# Patient Record
Sex: FEMALE | Race: WHITE | Employment: UNEMPLOYED | URBAN - METROPOLITAN AREA
[De-identification: names, ages, dates, MRNs, and addresses within clinical notes are randomized per-mention and may not be internally consistent; named-entity substitution may affect disease eponyms.]

---

## 2021-11-12 ENCOUNTER — TELEPHONE (OUTPATIENT)
Dept: CARDIOLOGY CLINIC | Age: 51
End: 2021-11-12

## 2022-01-06 PROBLEM — R07.9 CHEST PAIN: Status: ACTIVE | Noted: 2022-01-06

## 2022-01-06 PROBLEM — E78.5 HYPERLIPIDEMIA: Status: ACTIVE | Noted: 2022-01-06

## 2022-01-06 PROBLEM — R00.2 PALPITATIONS: Status: ACTIVE | Noted: 2022-01-06

## 2022-01-06 NOTE — PROGRESS NOTES
[facility]   Cardiac Consultation    Referring Provider:  Ranjana Morrison MD     Cc- chest pain,palpitations     History of Present Illness:   Detra Mcburney is a 54 y.o. female seen as a new patient for chest pain/palpitations. History of Hld, tobacco abuse, carotid disease. Today she reports that she has been having intermittent chest pain. She reports numbness in her upper chest and back as well as her lower legs (Lt more so that the Rt). She has pain in the back of her neck. Past Medical History:   has a past medical history of Asthma, COPD (chronic obstructive pulmonary disease), Hypercholesteremia, Migraine, Scoliosis, and Seasonal allergies. Surgical History:   has a past surgical history that includes Cholecystectomy; Tonsillectomy; Tubal ligation; and Tonsillectomy and adenoidectomy. Social History:   reports that she has been smoking. She has been smoking about 0.50 packs per day. She does not have any smokeless tobacco history on file. She reports that she does not drink alcohol and does not use drugs. Family History:  family history includes Heart Disease in her maternal grandfather, maternal grandmother, mother, paternal grandfather, and paternal grandmother. Home Medications:  Prior to Admission medications    Medication Sig Start Date End Date Taking? Authorizing Provider   etodolac (LODINE) 300 MG capsule Take 300 mg by mouth daily. Historical Provider, MD   gabapentin (NEURONTIN) 300 MG capsule Take 300 mg by mouth 3 times daily. Historical Provider, MD   cyclobenzaprine (FLEXERIL) 10 MG tablet Take 10 mg by mouth 3 times daily as needed. Historical Provider, MD   montelukast (SINGULAIR) 10 MG tablet Take 10 mg by mouth nightly. Historical Provider, MD   omeprazole-sodium bicarbonate (ZEGERID)  MG PACK 40 mg every morning (before breakfast).       Historical Provider, MD   hydrocodone-acetaminophen (NORCO) 5-325 MG per tablet temperature intolerance. No excessive thirst, fluid intake, or urination. No tremor. · Hematologic/Lymphatic: No abnormal bruising or bleeding, blood clots or swollen lymph nodes. · Allergic/Immunologic: No nasal congestion or hives. Physical Examination:    Vitals:    01/19/22 1022   BP: 100/60   Pulse: 105   SpO2: 98%          Wt Readings from Last 1 Encounters:   11/21/11 125 lb (56.7 kg)       Constitutional and General Appearance: NAD  Skin:good turgor,intact without lesions  HEENT: EOMI ,normal  Neck:no JVD    Respiratory:  · Normal excursion and expansion without use of accessory muscles  · Resp Auscultation: Normal breath sounds without dullness  Cardiovascular:  · The apical impulses not displaced  · Heart tones are crisp and normal  · Cervical veins are not engorged  · The carotid upstroke is normal in amplitude and contour without delay or bruit  ·   · Peripheral pulses are symmetrical and full  · There is no clubbing, cyanosis of the extremities. · No edema  · Femoral Arteries: 2+ and equal  · Pedal Pulses: 2+ and equal   Abdomen:  · No masses or tenderness  · Liver/Spleen: No Abnormalities Noted  Neurological/Psychiatric:  · Alert and oriented in all spheres  · Moves all extremities well  · Exhibits normal gait balance and coordination  · No abnormalities of mood, affect, memory, mentation, or behavior are noted      Assessment/Plan:    1. Chest pain     2.  Hypotension  /60 (Site: Right Upper Arm, Position: Sitting, Cuff Size: Medium Adult)   Pulse 105   Ht 5' 2\" (1.575 m)   Wt 109 lb 6.4 oz (49.6 kg)   SpO2 98%   BMI 20.01 kg/m²   -takes clonidine for sleep   -consider stopping d/t lower BP, OK to continue melatonin for sleep     3.          4.          5.         6.   Hyperlipidemia  8/2021   HDL 28   Currently not taking anything      Tobacco abuse -   Has previously stopped,  but restarted  Plan- counseled on importance of smoking cessation, she may consider trying chantix        Following [facility]      Carotid stenosis   Dopplers 1/18/22 16-49% bilateral internal arteries    Dopplers on lower extremities for claudication symptoms             Plan:  CT calcium score  ABIs with follow up next week   Nano Zuniga has a stable cardiac status. Cardiac test and lab results personally reviewed by me during this office visit and discussed. No med changes. Continue risk factor modifications. Call for any change in symptoms. Return for regular follow up in 3 months. I appreciate the opportunity of cooperating in the care of this individual.    Patient's problem list, medications, allergies, past medical, surgical, social and family histories were reviewed and updated as appropriate. Petey Neumann M.D., [facility]    The scribe's documentation has been prepared under my direction and personally reviewed by me in its entirety. I confirm that the note above accurately reflects all work, treatment, procedures, and medical decision making performed by me.

## 2022-01-19 ENCOUNTER — TELEPHONE (OUTPATIENT)
Dept: CARDIOLOGY CLINIC | Age: 52
End: 2022-01-19

## 2022-01-19 ENCOUNTER — OFFICE VISIT (OUTPATIENT)
Dept: CARDIOLOGY CLINIC | Age: 52
End: 2022-01-19
Payer: COMMERCIAL

## 2022-01-19 VITALS
SYSTOLIC BLOOD PRESSURE: 100 MMHG | HEIGHT: 62 IN | OXYGEN SATURATION: 98 % | HEART RATE: 105 BPM | WEIGHT: 109.4 LBS | BODY MASS INDEX: 20.13 KG/M2 | DIASTOLIC BLOOD PRESSURE: 60 MMHG

## 2022-01-19 DIAGNOSIS — R00.2 PALPITATIONS: ICD-10-CM

## 2022-01-19 DIAGNOSIS — R07.9 CHEST PAIN, UNSPECIFIED TYPE: Primary | ICD-10-CM

## 2022-01-19 DIAGNOSIS — E78.5 HYPERLIPIDEMIA, UNSPECIFIED HYPERLIPIDEMIA TYPE: ICD-10-CM

## 2022-01-19 DIAGNOSIS — I73.9 CLAUDICATION (HCC): ICD-10-CM

## 2022-01-19 PROCEDURE — G8427 DOCREV CUR MEDS BY ELIG CLIN: HCPCS | Performed by: INTERNAL MEDICINE

## 2022-01-19 PROCEDURE — 99203 OFFICE O/P NEW LOW 30 MIN: CPT | Performed by: INTERNAL MEDICINE

## 2022-01-19 PROCEDURE — G8420 CALC BMI NORM PARAMETERS: HCPCS | Performed by: INTERNAL MEDICINE

## 2022-01-19 PROCEDURE — G8484 FLU IMMUNIZE NO ADMIN: HCPCS | Performed by: INTERNAL MEDICINE

## 2022-01-19 PROCEDURE — 4004F PT TOBACCO SCREEN RCVD TLK: CPT | Performed by: INTERNAL MEDICINE

## 2022-01-19 RX ORDER — POLYETHYLENE GLYCOL 3350 17 G/17G
17 POWDER, FOR SOLUTION ORAL DAILY
COMMUNITY

## 2022-01-19 RX ORDER — CITALOPRAM 20 MG/1
TABLET ORAL
COMMUNITY
Start: 2021-11-03

## 2022-01-19 RX ORDER — ALBUTEROL SULFATE 90 UG/1
AEROSOL, METERED RESPIRATORY (INHALATION)
COMMUNITY
Start: 2021-08-20

## 2022-01-19 RX ORDER — LANOLIN ALCOHOL/MO/W.PET/CERES
10 CREAM (GRAM) TOPICAL NIGHTLY PRN
COMMUNITY

## 2022-01-19 RX ORDER — CLONIDINE HYDROCHLORIDE 0.2 MG/1
TABLET ORAL
COMMUNITY
Start: 2021-12-27

## 2022-01-19 NOTE — PATIENT INSTRUCTIONS
Recommend stopping clonidine since your blood pressure runs low - ok to continue with Melatonin  Schedule a CT calcium score to check calcium in your arteries  Schedule dopplers for your lower extremities to check your circulation

## 2022-01-19 NOTE — TELEPHONE ENCOUNTER
The VL DUP LOWER EXT test ordered, can not be done stat. Chart notes differ from what was ordered. Should it be PARKER's? If not, a new order needs placed for a routine VL DUP LOWER EXT. Call vascular lab with any questions.

## 2022-01-20 ENCOUNTER — HOSPITAL ENCOUNTER (OUTPATIENT)
Dept: CT IMAGING | Age: 52
Discharge: HOME | End: 2022-01-20
Payer: COMMERCIAL

## 2022-01-20 ENCOUNTER — HOSPITAL ENCOUNTER (OUTPATIENT)
Dept: VASCULAR LAB | Age: 52
Discharge: HOME | End: 2022-01-20
Payer: COMMERCIAL

## 2022-01-20 DIAGNOSIS — E78.5 HYPERLIPIDEMIA, UNSPECIFIED HYPERLIPIDEMIA TYPE: ICD-10-CM

## 2022-01-20 DIAGNOSIS — I73.9 CLAUDICATION (HCC): ICD-10-CM

## 2022-01-20 PROCEDURE — 75571 CT HRT W/O DYE W/CA TEST: CPT

## 2022-01-20 PROCEDURE — 93922 UPR/L XTREMITY ART 2 LEVELS: CPT

## 2022-01-24 NOTE — PROGRESS NOTES
[address]   Cardiac f/up    Referring Provider:  Manoj Muhammad MD     Cc- chest pain,palpitations     History of Present Illness:   George Mcbrdie is a 54 y.o. female seen as a f/upt for chest pain/palpitations. History of Hld, tobacco abuse, carotid disease. Today she reports that she has been having intermittent chest pain. She reports numbness in her upper chest and back as well as her lower legs (Lt more so that the Rt). She has pain in the back of her neck. Discussed her PARKER's and her CT Calcium score. She is getting an MRI of her back and neck. Past Medical History:   has a past medical history of Asthma, COPD (chronic obstructive pulmonary disease) ([address] Hypercholesteremia, Migraine, Scoliosis, and Seasonal allergies. Surgical History:   has a past surgical history that includes Cholecystectomy; Tonsillectomy; Tubal ligation; and Tonsillectomy and adenoidectomy. Social History:   reports that she has been smoking. She has been smoking about 0.50 packs per day. She does not have any smokeless tobacco history on file. She reports that she does not drink alcohol and does not use drugs. Family History:  family history includes Heart Disease in her maternal grandfather, maternal grandmother, mother, paternal grandfather, and paternal grandmother. Home Medications:  Prior to Admission medications    Medication Sig Start Date End Date Taking?  Authorizing Provider   albuterol sulfate HFA (PROAIR HFA) 108 (90 Base) MCG/ACT inhaler ProAir HFA 90 mcg/actuation aerosol inhaler 8/20/21   Historical Provider, MD   cloNIDine (CATAPRES) 0.2 MG tablet  12/27/21   Historical Provider, MD   citalopram (CELEXA) 20 MG tablet  11/3/21   Historical Provider, MD   melatonin 3 MG TABS tablet Take 10 mg by mouth nightly as needed    Historical Provider, MD   polyethylene glycol (MIRALAX) 17 GM/SCOOP powder Take 17 g by mouth daily    Historical Provider, MD   montelukast (SINGULAIR) 10 MG tablet Take 10 mg by mouth nightly. Historical Provider, MD   omeprazole-sodium bicarbonate (ZEGERID)  MG PACK 40 mg every morning (before breakfast). Historical Provider, MD   alprazolam Donodalysjerell Perish) 0.5 MG tablet Take 0.5 mg by mouth nightly as needed. Historical Provider, MD   loratadine (CLARITIN) 10 MG tablet Take 10 mg by mouth daily. Historical Provider, MD   UNABLE TO FIND Indications: BIRTH CONTROL  Patient not taking: Reported on 1/19/2022    Historical Provider, MD        Allergies:  Penicillins, Biaxin [clarithromycin], Imitrex [sumatriptan], Topamax, and Aspirin     Review of Systems:   · Constitutional: there has been no unanticipated weight loss. There's been no change in energy level, sleep pattern, or activity level. · Eyes: No visual changes or diplopia. No scleral icterus. · ENT: No Headaches, hearing loss or vertigo. No mouth sores or sore throat. · Cardiovascular: Reviewed in HPI  · Respiratory: No cough or wheezing, no sputum production. No hematemesis. · Gastrointestinal: No abdominal pain, appetite loss, blood in stools. No change in bowel or bladder habits. · Genitourinary: No dysuria, trouble voiding, or hematuria. · Musculoskeletal:  No gait disturbance, weakness or joint complaints. · Integumentary: No rash or pruritis. · Neurological: No headache, diplopia, change in muscle strength, numbness or tingling. No change in gait, balance, coordination, mood, affect, memory, mentation, behavior. · Psychiatric: No anxiety, no depression. · Endocrine: No malaise, fatigue or temperature intolerance. No excessive thirst, fluid intake, or urination. No tremor. · Hematologic/Lymphatic: No abnormal bruising or bleeding, blood clots or swollen lymph nodes. · Allergic/Immunologic: No nasal congestion or hives.     Physical Examination:    Vitals:    01/26/22 1054   BP: 108/64   Pulse: 103   SpO2: 98%          Wt Readings from Last 1 Encounters:   01/26/22 109 lb me during this office visit and discussed. No med changes. Symptoms are noncardiac based on evaluation. Would wait until MRI of spine done. Based on low cardiac calcium score would first use diet for lipid management. Likely will need statin but so much in way of pain symptoms would not want to start one yet  Continue risk factor modifications. Call for any change in symptoms. Return for regular follow up as needed. I appreciate the opportunity of cooperating in the care of this individual.    Patient's problem list, medications, allergies, past medical, surgical, social and family histories were reviewed and updated as appropriate. Nestor Hidalgo M.D., [address] attestation:  This note was scribed in the presence of Dr. Phong Hidalgo MD, by Mike James RN

## 2022-01-26 ENCOUNTER — OFFICE VISIT (OUTPATIENT)
Dept: CARDIOLOGY CLINIC | Age: 52
End: 2022-01-26
Payer: COMMERCIAL

## 2022-01-26 VITALS
DIASTOLIC BLOOD PRESSURE: 64 MMHG | BODY MASS INDEX: 20.09 KG/M2 | OXYGEN SATURATION: 98 % | WEIGHT: 109.2 LBS | HEIGHT: 62 IN | SYSTOLIC BLOOD PRESSURE: 108 MMHG | HEART RATE: 103 BPM

## 2022-01-26 DIAGNOSIS — I10 ESSENTIAL HYPERTENSION: Primary | ICD-10-CM

## 2022-01-26 DIAGNOSIS — E78.5 HYPERLIPIDEMIA, UNSPECIFIED HYPERLIPIDEMIA TYPE: ICD-10-CM

## 2022-01-26 PROCEDURE — G8484 FLU IMMUNIZE NO ADMIN: HCPCS | Performed by: INTERNAL MEDICINE

## 2022-01-26 PROCEDURE — G8420 CALC BMI NORM PARAMETERS: HCPCS | Performed by: INTERNAL MEDICINE

## 2022-01-26 PROCEDURE — G8427 DOCREV CUR MEDS BY ELIG CLIN: HCPCS | Performed by: INTERNAL MEDICINE

## 2022-01-26 PROCEDURE — 99214 OFFICE O/P EST MOD 30 MIN: CPT | Performed by: INTERNAL MEDICINE

## 2022-01-26 PROCEDURE — 4004F PT TOBACCO SCREEN RCVD TLK: CPT | Performed by: INTERNAL MEDICINE

## 2022-01-26 NOTE — PATIENT INSTRUCTIONS
No med changes. Continue risk factor modifications. Call for any change in symptoms. Return for regular follow up as needed.

## 2022-11-09 ENCOUNTER — TELEPHONE (OUTPATIENT)
Dept: CARDIOLOGY CLINIC | Age: 52
End: 2022-11-09

## 2022-11-09 NOTE — TELEPHONE ENCOUNTER
Pt came in the office to schedule an appt with les. Pt states she is still experiencing the heart racing feeling and would like to be seen. Please advise to where we can schedule her.  Please advise thank you

## 2022-11-09 NOTE — TELEPHONE ENCOUNTER
Pt will have monitor placed 11/10 at 230pm. Please advise a date and time for  1mnth f/u so pt can be informed tomorrow at appt.

## 2022-11-10 ENCOUNTER — NURSE ONLY (OUTPATIENT)
Dept: CARDIOLOGY CLINIC | Age: 52
End: 2022-11-10

## 2022-11-10 DIAGNOSIS — R00.2 PALPITATIONS: Primary | ICD-10-CM

## 2022-11-10 NOTE — TELEPHONE ENCOUNTER
Please offer / schedule apt on December 16 at 8:45 or 9:45 am in [facility] with Dr Dilip Aguilar. (Pt to come in today for monitor placement).

## 2022-11-16 PROBLEM — I10 ESSENTIAL HYPERTENSION: Status: ACTIVE | Noted: 2022-11-16

## 2022-11-16 NOTE — PROGRESS NOTES
[address]   Cardiac f/up    Referring Provider:  Gnozález Nolasco MD     Cc- chest pain,palpitations     History of Present Illness:   Manolo Branch is a 54 y.o. female seen as a f/upt for chest pain/palpitations. History of Hld, tobacco abuse, carotid disease. Today she reports that she has been having intermittent chest pain. She reports numbness in her upper chest and back as well as her lower legs (Lt more so that the Rt). She has pain in the back of her neck. Discussed her PARKER's and her CT Calcium score. She is getting an MRI of her back and neck. Past Medical History:   has a past medical history of Asthma, COPD (chronic obstructive pulmonary disease) ([facility] [address] Hypercholesteremia, Migraine, Scoliosis, and Seasonal allergies. Surgical History:   has a past surgical history that includes Cholecystectomy; Tonsillectomy; Tubal ligation; and Tonsillectomy and adenoidectomy. Social History:   reports that she has been smoking. She has been smoking an average of .5 packs per day. She has never used smokeless tobacco. She reports that she does not drink alcohol and does not use drugs. Family History:  family history includes Heart Disease in her maternal grandfather, maternal grandmother, mother, paternal grandfather, and paternal grandmother. Home Medications:  Prior to Admission medications    Medication Sig Start Date End Date Taking?  Authorizing Provider   albuterol sulfate HFA (PROAIR HFA) 108 (90 Base) MCG/ACT inhaler ProAir HFA 90 mcg/actuation aerosol inhaler 8/20/21   Historical Provider, MD   cloNIDine (CATAPRES) 0.2 MG tablet  12/27/21   Historical Provider, MD   citalopram (CELEXA) 20 MG tablet  11/3/21   Historical Provider, MD   melatonin 3 MG TABS tablet Take 10 mg by mouth nightly as needed    Historical Provider, MD   polyethylene glycol (MIRALAX) 17 GM/SCOOP powder Take 17 g by mouth daily    Historical Provider, MD   montelukast (SINGULAIR) 10 MG tablet Take 10 mg by mouth nightly. Historical Provider, MD   omeprazole-sodium bicarbonate (ZEGERID)  MG PACK 40 mg every morning (before breakfast). Historical Provider, MD   alprazolam Uriah Daniels) 0.5 MG tablet Take 0.5 mg by mouth nightly as needed. Historical Provider, MD   loratadine (CLARITIN) 10 MG tablet Take 10 mg by mouth daily. Historical Provider, MD   UNABLE TO FIND Indications: BIRTH CONTROL  Patient not taking: Reported on 1/19/2022    Historical Provider, MD        Allergies:  Penicillins, Biaxin [clarithromycin], Imitrex [sumatriptan], Topamax, and Aspirin     Review of Systems:   Constitutional: there has been no unanticipated weight loss. There's been no change in energy level, sleep pattern, or activity level. Eyes: No visual changes or diplopia. No scleral icterus. ENT: No Headaches, hearing loss or vertigo. No mouth sores or sore throat. Cardiovascular: Reviewed in HPI  Respiratory: No cough or wheezing, no sputum production. No hematemesis. Gastrointestinal: No abdominal pain, appetite loss, blood in stools. No change in bowel or bladder habits. Genitourinary: No dysuria, trouble voiding, or hematuria. Musculoskeletal:  No gait disturbance, weakness or joint complaints. Integumentary: No rash or pruritis. Neurological: No headache, diplopia, change in muscle strength, numbness or tingling. No change in gait, balance, coordination, mood, affect, memory, mentation, behavior. Psychiatric: No anxiety, no depression. Endocrine: No malaise, fatigue or temperature intolerance. No excessive thirst, fluid intake, or urination. No tremor. Hematologic/Lymphatic: No abnormal bruising or bleeding, blood clots or swollen lymph nodes. Allergic/Immunologic: No nasal congestion or hives. Physical Examination:    There were no vitals filed for this visit.          Wt Readings from Last 1 Encounters:   01/26/22 109 lb 3.2 oz (49.5 kg)       Constitutional and General Appearance: NAD  Skin:good turgor,intact without lesions  HEENT: EOMI ,normal  Neck:no JVD    Respiratory:  Normal excursion and expansion without use of accessory muscles  Resp Auscultation: Normal breath sounds without dullness  Cardiovascular: The apical impulses not displaced  Heart tones are crisp and normal  Cervical veins are not engorged  The carotid upstroke is normal in amplitude and contour without delay or bruit    Peripheral pulses are symmetrical and full  There is no clubbing, cyanosis of the extremities. No edema  Femoral Arteries: 2+ and equal  Pedal Pulses: 2+ and equal   Abdomen:  No masses or tenderness  Liver/Spleen: No Abnormalities Noted  Neurological/Psychiatric:  Alert and oriented in all spheres  Moves all extremities well  Exhibits normal gait balance and coordination  No abnormalities of mood, affect, memory, mentation, or behavior are noted      Assessment/Plan:    1. Chest Pain-  CT Calcium Score Total 1/20/22 : 1       2. Hypotension  There were no vitals taken for this visit.  -takes clonidine for sleep   -consider stopping d/t lower BP, OK to continue melatonin for sleep     3.          4.          5.         6.   Hyperlipidemia  8/10/2021   HDL 28   Currently not taking anything    Tobacco abuse -   Has previously stopped,  but restarted  Plan- counseled on importance of smoking cessation, she may consider trying chantix      Following [facility]-for thrombocytosis. Carotid stenosis   Dopplers 1/18/22 16-49% bilateral internal arteries    Dopplers on lower extremities for claudication symptoms. No significant peripheral vascular disease             Plan:  Melchor Morales has a stable cardiac status. Cardiac test and lab results personally reviewed by me during this office visit and discussed. No med changes. Symptoms are noncardiac based on evaluation. Would wait until MRI of spine done. Based on low cardiac calcium score would first use diet for lipid management.  Likely

## 2022-12-15 NOTE — PROGRESS NOTES
[facility]   Cardiac f/up    Referring Provider:  Ponciano Cockayne, MD     Cc- chest pain,palpitations     History of Present Illness:   Luz Chacon is a 54 y.o. female seen as a f/upt for chest pain/palpitations. History of Hld, tobacco abuse, carotid disease. 11/10/2022 holter placed for complaints of heart racing     Today she complains of heart racing and some chest discomfort at times. She has lost 20 lbs recently. She is getting ready to have endoscopy and colonoscopy on 12/22/22 to determine reason for weight loss. She states she is eating, but sometimes she doesn't have an appetite. States she didn't eat yesterday. She drinks MtDew. She used to drink back 4-5 a day and now only 1-1.5 a day. Discussed her heart racing and likely due to lack of calories and fluids. She does drink smart water. She reports her brothers both had testing and one did have blockage. Heart and lungs sound good on auscultation today. Past Medical History:   has a past medical history of Asthma, COPD (chronic obstructive pulmonary disease) ([address] Essential hypertension, Hypercholesteremia, Migraine, Scoliosis, and Seasonal allergies. Surgical History:   has a past surgical history that includes Cholecystectomy; Tonsillectomy; Tubal ligation; and Tonsillectomy and adenoidectomy. Social History:   reports that she has been smoking cigarettes. She has been smoking an average of .5 packs per day. She has never used smokeless tobacco. She reports that she does not drink alcohol and does not use drugs. Family History:  family history includes Heart Disease in her maternal grandfather, maternal grandmother, mother, paternal grandfather, and paternal grandmother. Home Medications:  Prior to Admission medications    Medication Sig Start Date End Date Taking?  Authorizing Provider   folic acid 5 MG/ML injection Inject 5 mg into the muscle daily 11/29/22 2/27/23 Yes Historical Provider, MD pyridoxine (B-6) 100 MG tablet Take 100 mg by mouth daily 11/29/22 11/29/23 Yes Historical Provider, MD   cyanocobalamin 1000 MCG/ML injection Inject 1,000 mcg into the muscle every 14 days 10/25/22  Yes Historical Provider, MD   albuterol sulfate HFA (PROVENTIL;VENTOLIN;PROAIR) 108 (90 Base) MCG/ACT inhaler ProAir HFA 90 mcg/actuation aerosol inhaler 8/20/21  Yes Historical Provider, MD   citalopram (CELEXA) 20 MG tablet Take 20 mg by mouth daily 11/3/21  Yes Historical Provider, MD   melatonin 3 MG TABS tablet Take 10 mg by mouth nightly as needed   Yes Historical Provider, MD   montelukast (SINGULAIR) 10 MG tablet Take 10 mg by mouth nightly. Yes Historical Provider, MD   omeprazole-sodium bicarbonate (ZEGERID)  MG PACK 40 mg every morning (before breakfast). Yes Historical Provider, MD   alprazolam Lesta Spells) 0.5 MG tablet Take 0.5 mg by mouth nightly as needed. Yes Historical Provider, MD   polyethylene glycol (MIRALAX) 17 GM/SCOOP powder Take 17 g by mouth daily    Historical Provider, MD   UNABLE TO FIND Indications: [address] [place] [address]  Patient not taking: Reported on 1/19/2022    Historical Provider, MD        Allergies:  Penicillins, Biaxin [clarithromycin], Imitrex [sumatriptan], Topamax, and Aspirin     Review of Systems:   Constitutional: there has been no unanticipated weight loss. There's been no change in energy level, sleep pattern, or activity level. Eyes: No visual changes or diplopia. No scleral icterus. ENT: No Headaches, hearing loss or vertigo. No mouth sores or sore throat. Cardiovascular: Reviewed in HPI  Respiratory: No cough or wheezing, no sputum production. No hematemesis. Gastrointestinal: No abdominal pain, appetite loss, blood in stools. No change in bowel or bladder habits. Genitourinary: No dysuria, trouble voiding, or hematuria. Musculoskeletal:  No gait disturbance, weakness or joint complaints. Integumentary: No rash or pruritis.   Neurological: No headache, diplopia, change in muscle strength, numbness or tingling. No change in gait, balance, coordination, mood, affect, memory, mentation, behavior. Psychiatric: No anxiety, no depression. Endocrine: No malaise, fatigue or temperature intolerance. No excessive thirst, fluid intake, or urination. No tremor. Hematologic/Lymphatic: No abnormal bruising or bleeding, blood clots or swollen lymph nodes. Allergic/Immunologic: No nasal congestion or hives. Physical Examination:    Vitals:    12/16/22 1002   BP: (!) 90/54   Pulse:    SpO2:             Wt Readings from Last 1 Encounters:   12/16/22 81 lb (36.7 kg)       Constitutional and General Appearance: NAD  Skin:good turgor,intact without lesions  HEENT: EOMI ,normal  Neck:no JVD    Respiratory:  Normal excursion and expansion without use of accessory muscles  Resp Auscultation: Normal breath sounds without dullness  Cardiovascular: The apical impulses not displaced  Heart tones are crisp and normal  Cervical veins are not engorged  The carotid upstroke is normal in amplitude and contour without delay or bruit    Peripheral pulses are symmetrical and full  There is no clubbing, cyanosis of the extremities. No edema  Femoral Arteries: 2+ and equal  Pedal Pulses: 2+ and equal   Abdomen:  No masses or tenderness  Liver/Spleen: No Abnormalities Noted  Neurological/Psychiatric:  Alert and oriented in all spheres  Moves all extremities well  Exhibits normal gait balance and coordination  No abnormalities of mood, affect, memory, mentation, or behavior are noted      Assessment/Plan:    1. Palpitations   -epatch placed 11/10/2022  one run of SVT      2. Hypotension    Vitals:    12/16/22 1002   BP: (!) 90/54   Pulse:    SpO2:    -takes clonidine for sleep - not taking currently 12/16/22  Needs to be off clonidine    3. Hyperlipidemia  -8/10/2021   HDL 28   --CT Calcium Score Total 1/20/22 : 1    4.  Tobacco abuse -   -Has previously stopped,  but restarted  -Plan- counseled on importance of smoking cessation, she may consider trying chantix   - getting ready to start chantix to help quitting     5. Carotid stenosis   -Dopplers 1/18/22 16-49% bilateral internal arteries    -Dopplers on lower extremities for claudication symptoms. No significant peripheral vascular disease        Plan:  Jennifer Rausch has a stable cardiac status. Cardiac test and lab results personally reviewed by me during this office visit and discussed. With profound weight loss statin not indicated  Continue risk factor modifications. Call for any change in symptoms. Work on [employer], good water hydration, stay away from caffeine  PCP starting chantix to help with smoking cessation. Echo soon  Return for regular follow up 1 month         I appreciate the opportunity of cooperating in the care of this individual.    Patient's problem list, medications, allergies, past medical, surgical, social and family histories were reviewed and updated as appropriate. Kevin Pérez M.D., [address]: This note was scribed in the presence of Dr. Zunilda Pérez  by Ian Madrigal RN. The scribe's documentation has been prepared under my direction and personally reviewed by me in its entirety. I confirm that the note above accurately reflects all work, treatment, procedures, and medical decision making performed by me.

## 2022-12-15 NOTE — PROGRESS NOTES
[facility]   Cardiac f/up    Referring Provider:  Fabien Lopez MD     Cc- chest pain,palpitations     History of Present Illness:   Rnean Wade is a 54 y.o. female seen as a f/upt for chest pain/palpitations. History of Hld, tobacco abuse, carotid disease. 11/10/2022 holter placed for complaints of heart racing     Today she ***      Past Medical History:   has a past medical history of Asthma, COPD (chronic obstructive pulmonary disease) ([address] Essential hypertension, Hypercholesteremia, Migraine, Scoliosis, and Seasonal allergies. Surgical History:   has a past surgical history that includes Cholecystectomy; Tonsillectomy; Tubal ligation; and Tonsillectomy and adenoidectomy. Social History:   reports that she has been smoking. She has been smoking an average of .5 packs per day. She has never used smokeless tobacco. She reports that she does not drink alcohol and does not use drugs. Family History:  family history includes Heart Disease in her maternal grandfather, maternal grandmother, mother, paternal grandfather, and paternal grandmother. Home Medications:  Prior to Admission medications    Medication Sig Start Date End Date Taking? Authorizing Provider   albuterol sulfate HFA (PROAIR HFA) 108 (90 Base) MCG/ACT inhaler ProAir HFA 90 mcg/actuation aerosol inhaler 8/20/21   Historical Provider, MD   cloNIDine (CATAPRES) 0.2 MG tablet  12/27/21   Historical Provider, MD   citalopram (CELEXA) 20 MG tablet  11/3/21   Historical Provider, MD   melatonin 3 MG TABS tablet Take 10 mg by mouth nightly as needed    Historical Provider, MD   polyethylene glycol (MIRALAX) 17 GM/SCOOP powder Take 17 g by mouth daily    Historical Provider, MD   montelukast (SINGULAIR) 10 MG tablet Take 10 mg by mouth nightly. Historical Provider, MD   omeprazole-sodium bicarbonate (ZEGERID)  MG PACK 40 mg every morning (before breakfast).       Historical Provider, MD   alprazolam Monna Pressman) 0.5 MG tablet Take 0.5 mg by mouth nightly as needed. Historical Provider, MD   loratadine (CLARITIN) 10 MG tablet Take 10 mg by mouth daily. Historical Provider, MD   UNABLE TO FIND Indications: BIRTH CONTROL  Patient not taking: Reported on 1/19/2022    Historical Provider, MD        Allergies:  Penicillins, Biaxin [clarithromycin], Imitrex [sumatriptan], Topamax, and Aspirin     Review of Systems:   Constitutional: there has been no unanticipated weight loss. There's been no change in energy level, sleep pattern, or activity level. Eyes: No visual changes or diplopia. No scleral icterus. ENT: No Headaches, hearing loss or vertigo. No mouth sores or sore throat. Cardiovascular: Reviewed in HPI  Respiratory: No cough or wheezing, no sputum production. No hematemesis. Gastrointestinal: No abdominal pain, appetite loss, blood in stools. No change in bowel or bladder habits. Genitourinary: No dysuria, trouble voiding, or hematuria. Musculoskeletal:  No gait disturbance, weakness or joint complaints. Integumentary: No rash or pruritis. Neurological: No headache, diplopia, change in muscle strength, numbness or tingling. No change in gait, balance, coordination, mood, affect, memory, mentation, behavior. Psychiatric: No anxiety, no depression. Endocrine: No malaise, fatigue or temperature intolerance. No excessive thirst, fluid intake, or urination. No tremor. Hematologic/Lymphatic: No abnormal bruising or bleeding, blood clots or swollen lymph nodes. Allergic/Immunologic: No nasal congestion or hives. Physical Examination:    There were no vitals filed for this visit.          Wt Readings from Last 1 Encounters:   01/26/22 109 lb 3.2 oz (49.5 kg)       Constitutional and General Appearance: NAD  Skin:good turgor,intact without lesions  HEENT: EOMI ,normal  Neck:no JVD    Respiratory:  Normal excursion and expansion without use of accessory muscles  Resp Auscultation: Normal breath sounds without dullness  Cardiovascular: The apical impulses not displaced  Heart tones are crisp and normal  Cervical veins are not engorged  The carotid upstroke is normal in amplitude and contour without delay or bruit    Peripheral pulses are symmetrical and full  There is no clubbing, cyanosis of the extremities. No edema  Femoral Arteries: 2+ and equal  Pedal Pulses: 2+ and equal   Abdomen:  No masses or tenderness  Liver/Spleen: No Abnormalities Noted  Neurological/Psychiatric:  Alert and oriented in all spheres  Moves all extremities well  Exhibits normal gait balance and coordination  No abnormalities of mood, affect, memory, mentation, or behavior are noted      Assessment/Plan:    1. Palpitations   -epatch placed 11/10/2022 ***      2. Hypotension    There were no vitals filed for this visit.  -takes clonidine for sleep   -consider stopping d/t lower BP, OK to continue melatonin for sleep    3. Hyperlipidemia  -8/10/2021   HDL 28   --CT Calcium Score Total 1/20/22 : 1    4. Tobacco abuse -   -Has previously stopped,  but restarted  -Plan- counseled on importance of smoking cessation, she may consider trying chantix      5. Carotid stenosis   -Dopplers 1/18/22 16-49% bilateral internal arteries    -Dopplers on lower extremities for claudication symptoms. No significant peripheral vascular disease        Plan:  Efraín Renteria has a stable cardiac status. Cardiac test and lab results personally reviewed by me during this office visit and discussed. No med changes. Symptoms are noncardiac based on evaluation. Would wait until MRI of spine done. Based on low cardiac calcium score would first use diet for lipid management. Likely will need statin but so much in way of pain symptoms would not want to start one yet  Continue risk factor modifications. Call for any change in symptoms.   Return for regular follow up ***        I appreciate the opportunity of cooperating in the care of this individual.    Patient's problem list, medications, allergies, past medical, surgical, social and family histories were reviewed and updated as appropriate. Jessenia Merchant M.D., [facility]

## 2022-12-16 ENCOUNTER — OFFICE VISIT (OUTPATIENT)
Dept: CARDIOLOGY CLINIC | Age: 52
End: 2022-12-16

## 2022-12-16 VITALS
HEIGHT: 62 IN | WEIGHT: 81 LBS | BODY MASS INDEX: 14.91 KG/M2 | HEART RATE: 83 BPM | DIASTOLIC BLOOD PRESSURE: 54 MMHG | OXYGEN SATURATION: 100 % | SYSTOLIC BLOOD PRESSURE: 90 MMHG

## 2022-12-16 DIAGNOSIS — E78.5 HYPERLIPIDEMIA, UNSPECIFIED HYPERLIPIDEMIA TYPE: ICD-10-CM

## 2022-12-16 DIAGNOSIS — R00.2 PALPITATIONS: ICD-10-CM

## 2022-12-16 DIAGNOSIS — I10 ESSENTIAL HYPERTENSION: Primary | ICD-10-CM

## 2022-12-16 RX ORDER — PYRIDOXINE HCL (VITAMIN B6) 100 MG
100 TABLET ORAL DAILY
COMMUNITY
Start: 2022-11-29 | End: 2023-11-29

## 2022-12-16 RX ORDER — CLONIDINE HYDROCHLORIDE 0.2 MG/1
0.2 TABLET ORAL 2 TIMES DAILY
Qty: 180 TABLET | Refills: 3 | Status: CANCELLED | OUTPATIENT
Start: 2022-12-16

## 2022-12-16 RX ORDER — CYANOCOBALAMIN 1000 UG/ML
1000 INJECTION, SOLUTION INTRAMUSCULAR; SUBCUTANEOUS
COMMUNITY
Start: 2022-10-25

## 2022-12-16 RX ORDER — FOLIC ACID 5 MG/ML
5 INJECTION, SOLUTION INTRAMUSCULAR; INTRAVENOUS; SUBCUTANEOUS DAILY
COMMUNITY
Start: 2022-11-29 | End: 2023-02-27

## 2022-12-16 NOTE — PATIENT INSTRUCTIONS
Follow up with Dr London Paige in 1 month with echo   Try to work on eating healthy diet, limit caffeine, and good hydration with water. Work on smoking cessation  Call for any questions or concerns.

## 2022-12-20 ENCOUNTER — TELEPHONE (OUTPATIENT)
Dept: CARDIOLOGY CLINIC | Age: 52
End: 2022-12-20

## 2022-12-20 NOTE — LETTER
[address] Cardiology [place] [address] [address] [address]  Phone: [phone]  Fax: [phone]    Saundra Horner MD        December 20, 2022     Patient: Krysten Chavez   YOB: 1970   Date of Visit: 12/20/2022       To Whom It May Concern: It is my medical opinion that Jeny Valencia is stable for this procedure. There are no apparent cardiac contraindications to the proposed procedure using standard anesthetic technique. There are no apparent interventions to ameliorate the cardiac risk. This clinical assessment assumes a full Anesthesia evaluation for overall risk of airway management, type and route of anesthetic, and other relevant anesthesia-specific considerations. .    If you have any questions or concerns, please don't hesitate to call.     Sincerely,        Saundra Horner MD

## 2022-12-20 NOTE — TELEPHONE ENCOUNTER
Nahum Membreno from [facility] calling back requesting heart monitor results to be faxed over   Fax [phone]

## 2022-12-20 NOTE — TELEPHONE ENCOUNTER
CARDIAC CLEARANCE     What type of procedure are you having? Egd,colonoscopy   Which physician is performing your procedure? Shayna Nolan  When is your procedure scheduled for?  12/22  Where are you having this procedure? [facility]  Are you taking Blood Thinners? If so what? (Name/dose/frequesncy)  no   Does the surgeon want you to stop your blood thinner? If so for how long?   N/a  Phone Number and Contact Name for Physicians office:  Peter Lozada [phone]  Fax number to send information:  [phone]

## 2023-02-04 PROBLEM — Z72.0 TOBACCO ABUSE: Status: ACTIVE | Noted: 2023-02-04

## 2023-02-04 PROBLEM — I65.29 CAROTID STENOSIS: Status: ACTIVE | Noted: 2023-02-04

## 2023-02-04 NOTE — PROGRESS NOTES
[facility]   Cardiac f/up    Referring Provider:  Lucyann Leventhal, MD     Chief Complaint   Patient presents with    Hypertension    Follow-up     Echo         History of Present Illness:  Shantell Shearer is a 50 y.o.  female seen as a f/upt for chest pain/palpitations. History of Hld, tobacco abuse, carotid disease. 11/10/2022 holter placed for complaints of heart racing     Today she is here for follow up. She is with her . She stays home most of the time. She is up to 83 pounds today. She states she has a big appetite. She does experience some heart racing. She was on chantix and stopped it due to nausea. Since then, she stopped smoking. Past Medical History:   has a past medical history of Asthma, COPD (chronic obstructive pulmonary disease) ([address] Essential hypertension, Hypercholesteremia, Migraine, Scoliosis, and Seasonal allergies. Surgical History:   has a past surgical history that includes Cholecystectomy; Tonsillectomy; Tubal ligation; Tonsillectomy and adenoidectomy; and Hysterectomy (02/11/2023). Social History:   reports that she has quit smoking. Her smoking use included cigarettes. She smoked an average of .5 packs per day. She has never used smokeless tobacco. She reports that she does not drink alcohol and does not use drugs. Family History:  family history includes Heart Disease in her maternal grandfather, maternal grandmother, mother, paternal grandfather, and paternal grandmother. Home Medications:  Prior to Admission medications    Medication Sig Start Date End Date Taking?  Authorizing Provider   ergocalciferol (ERGOCALCIFEROL) 1.25 MG (86063 UT) capsule Take 50,000 Units by mouth once a week 5/4/22  Yes Historical Provider, MD   folic acid 5 MG/ML injection Inject 5 mg into the muscle every other day 11/29/22 3/3/23 Yes Historical Provider, MD   pyridoxine (B-6) 100 MG tablet Take 100 mg by mouth daily 11/29/22 11/29/23 Yes Historical Provider, MD   cyanocobalamin 1000 MCG/ML injection Inject 1,000 mcg into the muscle every 14 days 10/25/22  Yes Historical Provider, MD   citalopram (CELEXA) 20 MG tablet Take 20 mg by mouth daily 11/3/21  Yes Historical Provider, MD   polyethylene glycol (GLYCOLAX) 17 GM/SCOOP powder Take 17 g by mouth daily   Yes Historical Provider, MD   montelukast (SINGULAIR) 10 MG tablet Take 10 mg by mouth nightly. Yes Historical Provider, MD   omeprazole-sodium bicarbonate (ZEGERID)  MG PACK 40 mg every morning (before breakfast). Yes Historical Provider, MD   alprazolam Ranny Parminder) 0.5 MG tablet Take 0.5 mg by mouth nightly as needed. Yes Historical Provider, MD        Allergies:  Penicillins, Biaxin [clarithromycin], Imitrex [sumatriptan], Topamax, and Aspirin     Review of Systems:   Constitutional: there has been no unanticipated weight loss. There's been no change in energy level, sleep pattern, or activity level. Eyes: No visual changes or diplopia. No scleral icterus. ENT: No Headaches, hearing loss or vertigo. No mouth sores or sore throat. Cardiovascular: Reviewed in HPI  Respiratory: No cough or wheezing, no sputum production. No hematemesis. Gastrointestinal: No abdominal pain, appetite loss, blood in stools. No change in bowel or bladder habits. Genitourinary: No dysuria, trouble voiding, or hematuria. Musculoskeletal:  No gait disturbance, weakness or joint complaints. Integumentary: No rash or pruritis. Neurological: No headache, diplopia, change in muscle strength, numbness or tingling. No change in gait, balance, coordination, mood, affect, memory, mentation, behavior. Psychiatric: No anxiety, no depression. Endocrine: No malaise, fatigue or temperature intolerance. No excessive thirst, fluid intake, or urination. No tremor. Hematologic/Lymphatic: No abnormal bruising or bleeding, blood clots or swollen lymph nodes. Allergic/Immunologic: No nasal congestion or hives.     Physical Examination:    Vitals:    03/03/23 1343   BP: 92/64   Pulse: 77   SpO2: 98%              Wt Readings from Last 1 Encounters:   03/03/23 83 lb (37.6 kg)       Constitutional and General Appearance: NAD  Skin:good turgor,intact without lesions  HEENT: EOMI ,normal  Neck:no JVD    Respiratory:  Normal excursion and expansion without use of accessory muscles  Resp Auscultation: Normal breath sounds without dullness  Cardiovascular: The apical impulses not displaced  Heart tones are crisp and normal  Cervical veins are not engorged  The carotid upstroke is normal in amplitude and contour without delay or bruit  Peripheral pulses are symmetrical and full  There is no clubbing, cyanosis of the extremities. No edema  Femoral Arteries: 2+ and equal  Pedal Pulses: 2+ and equal   Abdomen:  No masses or tenderness  Liver/Spleen: No Abnormalities Noted  Neurological/Psychiatric:  Alert and oriented in all spheres  Moves all extremities well  Exhibits normal gait balance and coordination  No abnormalities of mood, affect, memory, mentation, or behavior are noted    Echo 3/3/23 >   Summary  Normal left ventricle size, wall thickness and systolic function with an estimated ejection fraction of 60%. No regional wall motion  abnormalities are seen. E/e\"= 9. Diastolic filling parameters suggests normal diastolic function. Mild mitral valve prolapse. 2 jets of mild to moderate mitral regurgitation. Mild to moderate tricuspid regurgitation. RVSP 18mmHg. IVC size is normal (<2.1cm) and collapses > 50% with respiration consistent with normal RA pressure (3mmHg). Assessment/Plan:    1. Palpitations   - pt continues to feel her heart racing a times   -epatch placed 11/10/2022  one run of SVT      2. Hypotension    Vitals:    03/03/23 1343   BP: 92/64   Pulse: 77   SpO2: 98%     -takes clonidine for sleep - not taking currently 12/16/22  Needs to be off clonidine    3.  Hyperlipidemia  -8/10/2021   HDL 28 LDL 203  --CT Calcium Score Total 1/20/22 : 1    4. Tobacco abuse -   -Has previously stopped, but restarted  -Plan- counseled on importance of smoking cessation, Chantix ordered per PCP  -Ok to take 1/2 dose of chantix    5. Carotid stenosis   -Dopplers 1/18/22 16-49% bilateral internal arteries    -Dopplers on lower extremities for claudication symptoms. No significant peripheral vascular disease    6 Mitral regugitation     Echo 3/3/23 mild to moderate MR     Repeat echo in 1 year      Plan:  Cardiac test and lab results personally reviewed by me during this office visit and discussed. Echo in 1 year  No dietary restrictions  Ok to take 1/2 dose of chantix  Continue risk factor modifications. Call for any change in symptoms, call to report any changes in shortness of breath or development of chest pain with activity. Follow up in 6 mos           I appreciate the opportunity of cooperating in the care of this individual.        Terrance Chen. Taylor Foster M.D., [address] [facility]      Patient's problem list, medications, allergies, past medical, surgical, social and family histories were reviewed and updated as appropriate. Scribe's attestation: This note was scribed in the presence of Dr Taylor Foster by Mita Mansfield RN. The scribe's documentation has been prepared under my direction and personally reviewed by me in its entirety. I confirm that the note above accurately reflects all work, treatment, procedures, and medical decision making performed by me.

## 2023-03-03 ENCOUNTER — PROCEDURE VISIT (OUTPATIENT)
Dept: CARDIOLOGY CLINIC | Age: 53
End: 2023-03-03
Payer: COMMERCIAL

## 2023-03-03 ENCOUNTER — OFFICE VISIT (OUTPATIENT)
Dept: CARDIOLOGY CLINIC | Age: 53
End: 2023-03-03
Payer: COMMERCIAL

## 2023-03-03 VITALS
OXYGEN SATURATION: 98 % | HEART RATE: 77 BPM | WEIGHT: 83 LBS | HEIGHT: 62 IN | BODY MASS INDEX: 15.27 KG/M2 | DIASTOLIC BLOOD PRESSURE: 64 MMHG | SYSTOLIC BLOOD PRESSURE: 92 MMHG

## 2023-03-03 DIAGNOSIS — E78.5 HYPERLIPIDEMIA, UNSPECIFIED HYPERLIPIDEMIA TYPE: ICD-10-CM

## 2023-03-03 DIAGNOSIS — Z72.0 TOBACCO ABUSE: ICD-10-CM

## 2023-03-03 DIAGNOSIS — I10 ESSENTIAL HYPERTENSION: ICD-10-CM

## 2023-03-03 DIAGNOSIS — I05.9 MITRAL VALVE PROBLEM: ICD-10-CM

## 2023-03-03 DIAGNOSIS — I65.23 BILATERAL CAROTID ARTERY STENOSIS: ICD-10-CM

## 2023-03-03 DIAGNOSIS — R00.2 PALPITATIONS: Primary | ICD-10-CM

## 2023-03-03 DIAGNOSIS — R00.2 PALPITATIONS: ICD-10-CM

## 2023-03-03 LAB
LV EF: 60 %
LVEF MODALITY: NORMAL

## 2023-03-03 PROCEDURE — 3017F COLORECTAL CA SCREEN DOC REV: CPT | Performed by: INTERNAL MEDICINE

## 2023-03-03 PROCEDURE — G8427 DOCREV CUR MEDS BY ELIG CLIN: HCPCS | Performed by: INTERNAL MEDICINE

## 2023-03-03 PROCEDURE — 3078F DIAST BP <80 MM HG: CPT | Performed by: INTERNAL MEDICINE

## 2023-03-03 PROCEDURE — G8484 FLU IMMUNIZE NO ADMIN: HCPCS | Performed by: INTERNAL MEDICINE

## 2023-03-03 PROCEDURE — G8419 CALC BMI OUT NRM PARAM NOF/U: HCPCS | Performed by: INTERNAL MEDICINE

## 2023-03-03 PROCEDURE — 3074F SYST BP LT 130 MM HG: CPT | Performed by: INTERNAL MEDICINE

## 2023-03-03 PROCEDURE — 93306 TTE W/DOPPLER COMPLETE: CPT | Performed by: INTERNAL MEDICINE

## 2023-03-03 PROCEDURE — 1036F TOBACCO NON-USER: CPT | Performed by: INTERNAL MEDICINE

## 2023-03-03 PROCEDURE — 99214 OFFICE O/P EST MOD 30 MIN: CPT | Performed by: INTERNAL MEDICINE

## 2023-03-03 RX ORDER — ERGOCALCIFEROL 1.25 MG/1
50000 CAPSULE ORAL WEEKLY
COMMUNITY
Start: 2022-05-04

## 2023-03-03 NOTE — PATIENT INSTRUCTIONS
Echo in 1 year  No dietary restrictions  Continue risk factor modifications. Call for any change in symptoms, call to report any changes in shortness of breath or development of chest pain with activity.     Follow up in 6 mos

## 2023-09-06 NOTE — PROGRESS NOTES
[facility]   Cardiac f/up    Referring Provider:  Denise Boyle MD     Chief Complaint   Patient presents with    Hypertension    Hyperlipidemia    6 Month Follow-Up    Chest Pain       History of Present Illness:  Anita Saravia is a 50 y.o.  female seen as a f/upt for chest pain/palpitations. History of Hld, tobacco abuse, carotid disease. 11/10/2022 holter placed for complaints of heart racing     Today she is here for 6 mos follow up. She is with her . She started Chantix and stopped smoking in 2 days. Sometimes she has chest pain that radiates to let shoulder and neck. Chest wall is slightly tender to touch. She states she has pain r/t neuropathy at times. She has low energy/feels tired, she mostly sits and plays on her tablet, reports she is not active. Denies sob. Dr Jeanine Caballero recommends chest CTs (last one was almost 1 year ago)    Past Medical History:   has a past medical history of Asthma, COPD (chronic obstructive pulmonary disease) ([address]), Essential hypertension, Hypercholesteremia, Migraine, Scoliosis, and Seasonal allergies. Surgical History:   has a past surgical history that includes Cholecystectomy; Tonsillectomy; Tubal ligation; Tonsillectomy and adenoidectomy; and Hysterectomy (02/11/2023). Social History:   reports that she has quit smoking. Her smoking use included cigarettes. She smoked an average of .5 packs per day. She has never used smokeless tobacco. She reports that she does not drink alcohol and does not use drugs. Family History:  family history includes Heart Disease in her maternal grandfather, maternal grandmother, mother, paternal grandfather, and paternal grandmother. Home Medications:  Prior to Admission medications    Medication Sig Start Date End Date Taking?  Authorizing Provider   ergocalciferol (ERGOCALCIFEROL) 1.25 MG (15393 UT) capsule Take 1 capsule by mouth once a week 5/4/22  Yes Historical Provider, MD   folic acid 5 MG/ML

## 2023-09-15 ENCOUNTER — OFFICE VISIT (OUTPATIENT)
Dept: CARDIOLOGY CLINIC | Age: 53
End: 2023-09-15

## 2023-09-15 VITALS
OXYGEN SATURATION: 98 % | DIASTOLIC BLOOD PRESSURE: 68 MMHG | WEIGHT: 95.5 LBS | SYSTOLIC BLOOD PRESSURE: 100 MMHG | HEART RATE: 75 BPM | BODY MASS INDEX: 17.57 KG/M2 | HEIGHT: 62 IN

## 2023-09-15 DIAGNOSIS — R00.2 PALPITATIONS: ICD-10-CM

## 2023-09-15 DIAGNOSIS — R53.83 OTHER FATIGUE: ICD-10-CM

## 2023-09-15 DIAGNOSIS — R07.9 CHEST PAIN, UNSPECIFIED TYPE: Primary | ICD-10-CM

## 2023-09-15 DIAGNOSIS — I05.9 MITRAL VALVE PROBLEM: ICD-10-CM

## 2023-09-15 DIAGNOSIS — Z72.0 TOBACCO ABUSE: ICD-10-CM

## 2023-09-15 DIAGNOSIS — E78.5 HYPERLIPIDEMIA, UNSPECIFIED HYPERLIPIDEMIA TYPE: ICD-10-CM

## 2023-09-15 DIAGNOSIS — I65.23 BILATERAL CAROTID ARTERY STENOSIS: ICD-10-CM

## 2023-09-15 NOTE — PATIENT INSTRUCTIONS
Cardiac calcium score (ok to obtain at [facility])  Continue risk factor modifications. Call for any change in symptoms, call to report any changes in shortness of breath or development of chest pain with activity.     Follow up in 6 mos or sooner based on testing

## 2023-10-06 ENCOUNTER — TELEPHONE (OUTPATIENT)
Dept: CARDIOLOGY CLINIC | Age: 53
End: 2023-10-06

## 2023-10-06 NOTE — TELEPHONE ENCOUNTER
Pt called to request an order for a CT Scan of her heart and lung to be sent to [facility] Scheduling:  Fax:  [phone] and phone :  [phone]. Please advise.   Thank you

## 2023-10-06 NOTE — TELEPHONE ENCOUNTER
Pt called to inform the office that [facility] fax over 10/05 that they needed clarification of the heart for the CT Scan. Please advise.   Thank you

## 2023-10-09 NOTE — TELEPHONE ENCOUNTER
Spoke with Nighat at [facility] scheduling and this request was for a PET scan from Dr. Trang Ash.  This message can be disregarded

## 2023-10-10 NOTE — TELEPHONE ENCOUNTER
Jalzyn Escalera from [facility] called to request the order for   CT CARDIAC CALCIUM SCORING [[id number]] (Order [id number]) be fax to:  [phone]. If there are any questions, please call Jazlyn Escalera on [phone]. Please advise.   Thank you

## 2023-11-03 ENCOUNTER — TELEPHONE (OUTPATIENT)
Dept: CARDIOLOGY CLINIC | Age: 53
End: 2023-11-03

## 2023-11-03 NOTE — TELEPHONE ENCOUNTER
Nadeem Rivas  called to give the Pt cardiac scoring CT: [employer] you need to call at:  [phone] - give them the reference number [id number]. Once the peer to peer is completed, and if you are able to get it approved, call are fax the information to Nadeem Rivas she is needing the authorization number and date of approval.  Then Nadeem Rivas can get the Pt back on the scheduled. Please advise.   Thank you

## 2023-11-07 NOTE — TELEPHONE ENCOUNTER
Left VM that doing test at [facility] is another option for her which may be more economical.  [facility] phone number relayed so she could schedule test.

## 2023-12-22 PROBLEM — I34.0 MITRAL REGURGITATION: Status: ACTIVE | Noted: 2023-12-22

## 2023-12-26 ENCOUNTER — TELEPHONE (OUTPATIENT)
Dept: CARDIOLOGY CLINIC | Age: 53
End: 2023-12-26

## 2023-12-26 DIAGNOSIS — I21.4 NSTEMI (NON-ST ELEVATED MYOCARDIAL INFARCTION) (HCC): Primary | ICD-10-CM

## 2023-12-26 NOTE — TELEPHONE ENCOUNTER
Dr Mendoza would like patient to get a cardiac MRI. Recently admitted with NSTEMI with normal coronary arteries when [facility] done. MRI to rule out myocarditis.     Order placed. Left message on patient's VM asking her to call back to discuss.

## 2023-12-26 NOTE — TELEPHONE ENCOUNTER
I want actual ECG to look at and actual cardiac cath films   Mom requested if you can also send in a z-pack?  Tested negative for covid  5x days possible sinus infection   She said she is not able to bring him in.  Call back # is [phone]

## 2023-12-26 NOTE — TELEPHONE ENCOUNTER
Kaitlin with [facility] is returning call stating pt went to [place] and we will need to contact them for the records as she does not have access.    Please call [phone] and ask for cath lab.  She states they should be able to share everywhere, but she is not sure if they are in office today as some of their offices are closed.

## 2023-12-26 NOTE — TELEPHONE ENCOUNTER
Her films have been pushed but likely will need IT help to find it. This is not urgent so will do in January

## 2023-12-26 NOTE — TELEPHONE ENCOUNTER
Spoke to [facility] medical records- they will be faxing EKG tracings during patient's recent admission to our office.     Left message for cath lab to mail CD of cardiac cath images from 12/19/23.

## 2024-01-19 ENCOUNTER — TELEPHONE (OUTPATIENT)
Dept: CARDIOLOGY CLINIC | Age: 54
End: 2024-01-19

## 2024-01-20 NOTE — TELEPHONE ENCOUNTER
Received page from on-call from [place]. She reports she has has acute change in color of both legs from thigh down-purple in color. Reportedly cool to couch per her . Reports her legs always have discomfort due to neuropathy. History was difficult to obtain over the phone. Advised that since I am not able to physically examine the patient, recommend to be evaluated in the ED. Patient's  verbalized understanding.

## 2024-02-20 ENCOUNTER — OFFICE VISIT (OUTPATIENT)
Dept: CARDIOLOGY CLINIC | Age: 54
End: 2024-02-20
Payer: COMMERCIAL

## 2024-02-20 VITALS
BODY MASS INDEX: 20.06 KG/M2 | HEIGHT: 62 IN | OXYGEN SATURATION: 99 % | SYSTOLIC BLOOD PRESSURE: 110 MMHG | DIASTOLIC BLOOD PRESSURE: 74 MMHG | WEIGHT: 109 LBS | HEART RATE: 68 BPM

## 2024-02-20 DIAGNOSIS — I47.10 SVT (SUPRAVENTRICULAR TACHYCARDIA): ICD-10-CM

## 2024-02-20 DIAGNOSIS — R55 SYNCOPE AND COLLAPSE: ICD-10-CM

## 2024-02-20 DIAGNOSIS — I10 ESSENTIAL HYPERTENSION: Primary | ICD-10-CM

## 2024-02-20 PROCEDURE — 3078F DIAST BP <80 MM HG: CPT | Performed by: INTERNAL MEDICINE

## 2024-02-20 PROCEDURE — 1036F TOBACCO NON-USER: CPT | Performed by: INTERNAL MEDICINE

## 2024-02-20 PROCEDURE — 3074F SYST BP LT 130 MM HG: CPT | Performed by: INTERNAL MEDICINE

## 2024-02-20 PROCEDURE — G8484 FLU IMMUNIZE NO ADMIN: HCPCS | Performed by: INTERNAL MEDICINE

## 2024-02-20 PROCEDURE — 99205 OFFICE O/P NEW HI 60 MIN: CPT | Performed by: INTERNAL MEDICINE

## 2024-02-20 PROCEDURE — 93000 ELECTROCARDIOGRAM COMPLETE: CPT | Performed by: INTERNAL MEDICINE

## 2024-02-20 PROCEDURE — G8427 DOCREV CUR MEDS BY ELIG CLIN: HCPCS | Performed by: INTERNAL MEDICINE

## 2024-02-20 PROCEDURE — G8420 CALC BMI NORM PARAMETERS: HCPCS | Performed by: INTERNAL MEDICINE

## 2024-02-20 PROCEDURE — 3017F COLORECTAL CA SCREEN DOC REV: CPT | Performed by: INTERNAL MEDICINE

## 2024-02-20 NOTE — PATIENT INSTRUCTIONS
Our  will be contacting you with a date and time for your procedure    The morning of your ablation you will need to check in at the registration desk in the main lobby.     PRE-PROCEDURE INSTRUCTIONS -   -Do not eat or drink anything after midnight the night before your ablation.  -You will need to have a responsible adult to drive you home.  -Your nurse will provide you with discharge instructions.  -You will need to hold your Lopressor for 2 days prior   -If you are taking a diuretic (water pill) please hold the morning of the procedure  -If you take long acting insulin, take 1/2 the dose the evening before or morning of depending on when you take your dosage.  -You may take all of your other medications with a sip of water.    You will be scheduled for a 6-8 week follow up with cardiology.  This will be done prior to your discharge instructions.    If you have any questions regarding the procedure itself or medications, please call [phone] and ask to speak to an EP nurse.

## 2024-02-20 NOTE — PROGRESS NOTES
[facility]   Electrophysiology Consultation   Date: 2/20/2024  Reason for Consultation: SVT   Consult Requesting Physician: Dr.Lester Mendoza     CC: ***   HPI: Elvia Shabazz is a 53 y.o. female history of SVT, HTN, COPD and hypercholesteremia     11/10/2022 holter placed for complaints of heart racing showed one run of SVT    12/17/2023 was admitted to [facility] following a syncopal episode. Complained of diaphoresis and erratic pulse. Presumed to be related to possible SVT episode    Assessment and plan:   -SVT   ECG today shows ***   Brief AT noted on 11/10/2022    -Syncope     -HTN  Controlled/Not well controlled***  BP goal <130/80    Ordered:     Relevant available labs, and cardiovascular diagnostics, documents reviewed.   ECG 2/20/24  ***SR/AFIB, QTcH ***,QRS ***      Stress Test 12/18/2023  1. Myocardial perfusion imaging was performed in conjunction and      will be reported separately.     Impressions:  No EKG changes diagnostic for ischemia noted with   Lexiscan.     Echo 12/18/2023  1. Left ventricle: The cavity size was normal. Wall thickness was      normal. Systolic function was at the lower limits of normal. The      estimated ejection fraction was in the range of 50% to 55%. Left      ventricular diastolic function parameters were normal for the      patient's age. Global longitudinal strain rate of -6.80%. The      longitudal strain study is abnormal.   2. Regional wall motion abnormality: Mild hypokinesis of the mid      anterior and mid anteroseptal myocardium.   3. Aortic valve: Peak velocity (S): 1.03m/sec. Mean gradient (S):      2mm Hg. VTI ratio of LVOT to aortic valve: 0.7. Valve area      (VTI): 2.2cm^2.   4. Mitral valve: There was mild regurgitation.   5. Right ventricle: The cavity size was normal. Wall thickness was      normal. Systolic function was normal.   6. Tricuspid valve: There was mild-moderate regurgitation.   7. Pulmonary arteries: Estimated PA peak pressure is 18mm Hg 
      Social History:  Reviewed.  reports that she has quit smoking. Her smoking use included cigarettes. She has never used smokeless tobacco. She reports that she does not drink alcohol and does not use drugs.     Family History:  Reviewed. Reviewed. No family history of SCD.      All questions and concerns were addressed to the patient/family. Alternatives to my treatment were discussed. I have discussed the above stated plan and the patient verbalized understanding and agreed with the plan.    Scribe attestation: This note was scribed in the presence of Kodi Noble MD by Pooja Hawk LPN    Physician Attestation: I, Dr. Kodi noble, confirm that the scribe's documentation has been prepared under my direction and personally reviewed by me in its entirety.  I also confirm that the note above accurately reflects all work, treatment, procedures, and medical decision making performed by me.       NOTE: This report was transcribed using voice recognition software. Every effort was made to ensure accuracy, however, inadvertent computerized transcription errors may be present.     Kodi Noble MD, MPH  [facility]   Office: [phone]  Fax: [phone]

## 2024-02-21 ENCOUNTER — HOSPITAL ENCOUNTER (OUTPATIENT)
Dept: MRI IMAGING | Age: 54
Discharge: HOME | End: 2024-02-21
Attending: INTERNAL MEDICINE
Payer: COMMERCIAL

## 2024-02-21 DIAGNOSIS — I21.4 NSTEMI (NON-ST ELEVATED MYOCARDIAL INFARCTION) (HCC): ICD-10-CM

## 2024-02-21 PROCEDURE — A9585 GADOBUTROL INJECTION: HCPCS | Performed by: INTERNAL MEDICINE

## 2024-02-21 PROCEDURE — 75565 CARD MRI VELOC FLOW MAPPING: CPT

## 2024-02-21 PROCEDURE — 6360000004 HC RX CONTRAST MEDICATION: Performed by: INTERNAL MEDICINE

## 2024-02-21 RX ORDER — GADOBUTROL 604.72 MG/ML
8 INJECTION INTRAVENOUS
Status: COMPLETED | OUTPATIENT
Start: 2024-02-21 | End: 2024-02-21

## 2024-02-21 RX ADMIN — GADOBUTROL 8 ML: 604.72 INJECTION INTRAVENOUS at 17:30

## 2024-03-04 ENCOUNTER — TELEPHONE (OUTPATIENT)
Dept: CARDIOLOGY CLINIC | Age: 54
End: 2024-03-04

## 2024-03-06 NOTE — TELEPHONE ENCOUNTER
LVM for pt that I don't have dates for procedure currently I will be in touch as soon as I do have more dates available. I am scheduled through end of May currently.

## 2024-03-12 ENCOUNTER — TELEPHONE (OUTPATIENT)
Dept: CARDIOLOGY CLINIC | Age: 54
End: 2024-03-12

## 2024-03-12 NOTE — TELEPHONE ENCOUNTER
LVM for pt that I don't have any dated and will be on vacation for the next week. I will be in contact once I return and dates are avail.     Procedure - EP Study/SVT Abl   Date:  Arrival time:  Procedure time:     Patient Instructions  Dx:SVT                        ICD-10 code: I47.1  PRE-PROCEDURE INSTRUCTIONS -   -Do not eat or drink anything after midnight the night before your ablation.  -You will need to have a responsible adult to drive you home.  -Your nurse will provide you with discharge instructions.  -You will need to hold your Lopressor for 2 days prior   -You may take all of your other medications with a sip of water.

## 2024-03-20 PROBLEM — I95.9 HYPOTENSION: Status: ACTIVE | Noted: 2024-03-20

## 2024-03-20 NOTE — PROGRESS NOTES
[facility]   Cardiac f/up    Referring Provider:  Win Rubin MD     Chief Complaint   Patient presents with    Hypertension       History of Present Illness:  Elvia Shabazz is a 53 y.o.  female being seen today in hospital follow up.  She was recently hospitalized at [facility] for syncope and collapse due to SVT. She has a history of MR, Hld, COPD with ongoing tobacco abuse, carotid disease, and anorexia.    11/10/2022 holter placed for complaints of heart racing showed one run of SVT.    Last OV on 12/22/23 - she reports being at [employer] and feeling fine one minute and then suddenly felt weak, lightheaded, and jittery. She had a syncopal episode and awoke in the life squad. When she came to she felt fine. Echo was done and showed normal LV function with some regional wall motion abnormality. She had an abnormal stress test and ultimately had cardiac cath which showed small arteries but no significant CAD. She was sent home on ASA and she thinks it is making her nauseated. She was also started on Lisinopril, Lopressor, and Lipitor. She has sinus pressure behind her ears and she was sent home with a script for Azithromycin but they told her to get permission from me to take it.    In the interval, Elvia had a cardiac MRI done which ruled out inflammation in heart muscle.  She established with EP, Dr. Cuenca.  Reported improvement in feeling her heart racing since starting the Lopressor and decided to proceed with EPS/ablation which has yet to be scheduled.      Today, Elvia states that she continues to feel her heart racing.  Reports that the only way it is relieved is if she goes and lays down.  She is waiting on a phone call to schedule an ablation and was told that it would be sometime late may.  Patient denies exertional chest pain, MORE/PND, light-headedness, edema.      Past Medical History:   has a past medical history of Asthma, COPD (chronic obstructive pulmonary disease)

## 2024-03-27 ENCOUNTER — OFFICE VISIT (OUTPATIENT)
Dept: CARDIOLOGY CLINIC | Age: 54
End: 2024-03-27
Payer: COMMERCIAL

## 2024-03-27 VITALS
SYSTOLIC BLOOD PRESSURE: 115 MMHG | BODY MASS INDEX: 20.87 KG/M2 | OXYGEN SATURATION: 99 % | DIASTOLIC BLOOD PRESSURE: 80 MMHG | HEIGHT: 62 IN | HEART RATE: 51 BPM | WEIGHT: 113.4 LBS

## 2024-03-27 DIAGNOSIS — I65.23 BILATERAL CAROTID ARTERY STENOSIS: ICD-10-CM

## 2024-03-27 DIAGNOSIS — Z72.0 TOBACCO ABUSE: ICD-10-CM

## 2024-03-27 DIAGNOSIS — R55 SYNCOPE AND COLLAPSE: ICD-10-CM

## 2024-03-27 DIAGNOSIS — I47.10 SVT (SUPRAVENTRICULAR TACHYCARDIA) (HCC): Primary | ICD-10-CM

## 2024-03-27 DIAGNOSIS — I34.0 MITRAL VALVE INSUFFICIENCY, UNSPECIFIED ETIOLOGY: ICD-10-CM

## 2024-03-27 DIAGNOSIS — E78.5 HYPERLIPIDEMIA, UNSPECIFIED HYPERLIPIDEMIA TYPE: ICD-10-CM

## 2024-03-27 DIAGNOSIS — I95.9 HYPOTENSION, UNSPECIFIED HYPOTENSION TYPE: ICD-10-CM

## 2024-03-27 PROCEDURE — 3074F SYST BP LT 130 MM HG: CPT | Performed by: INTERNAL MEDICINE

## 2024-03-27 PROCEDURE — 3079F DIAST BP 80-89 MM HG: CPT | Performed by: INTERNAL MEDICINE

## 2024-03-27 PROCEDURE — 99214 OFFICE O/P EST MOD 30 MIN: CPT | Performed by: INTERNAL MEDICINE

## 2024-03-27 PROCEDURE — 1036F TOBACCO NON-USER: CPT | Performed by: INTERNAL MEDICINE

## 2024-03-27 PROCEDURE — 3017F COLORECTAL CA SCREEN DOC REV: CPT | Performed by: INTERNAL MEDICINE

## 2024-03-27 PROCEDURE — G8484 FLU IMMUNIZE NO ADMIN: HCPCS | Performed by: INTERNAL MEDICINE

## 2024-03-27 PROCEDURE — G8427 DOCREV CUR MEDS BY ELIG CLIN: HCPCS | Performed by: INTERNAL MEDICINE

## 2024-03-27 PROCEDURE — G8420 CALC BMI NORM PARAMETERS: HCPCS | Performed by: INTERNAL MEDICINE

## 2024-03-27 PROCEDURE — 93000 ELECTROCARDIOGRAM COMPLETE: CPT | Performed by: INTERNAL MEDICINE

## 2024-03-27 RX ORDER — METOPROLOL TARTRATE 50 MG/1
50 TABLET, FILM COATED ORAL 2 TIMES DAILY
Qty: 200 TABLET | Refills: 3 | Status: SHIPPED | OUTPATIENT
Start: 2024-03-27

## 2024-03-27 RX ORDER — ASPIRIN 81 MG/1
81 TABLET ORAL DAILY
Qty: 90 TABLET | Refills: 3 | Status: SHIPPED | OUTPATIENT
Start: 2024-03-27

## 2024-03-27 NOTE — PATIENT INSTRUCTIONS
Take an addition 25 mg (1/2 tablet) of Lopressor every 12 hours as needed for feeling your heart race    Follow up with Dr. Mendoza in 4 months

## 2024-03-29 NOTE — TELEPHONE ENCOUNTER
Spoke with the pt and got her scheduled for procedure. We went over instructions below and she verbalized understanding.     Procedure - EP Study/SVT Abl   Date: 5/30/24   Arrival time: 9:30 am   Procedure time: 10:30 am      Patient Instructions  Dx:SVT                        ICD-10 code: I47.1  PRE-PROCEDURE INSTRUCTIONS -   -Do not eat or drink anything after midnight the night before your ablation.  -You will need to have a responsible adult to drive you home.  -Your nurse will provide you with discharge instructions.  -You will need to hold your Lopressor for 2 days prior   -You may take all of your other medications with a sip of water.    Qgenda updated / added in epic & carto / emailed cath lab

## 2024-05-28 ENCOUNTER — TELEPHONE (OUTPATIENT)
Dept: CARDIOLOGY CLINIC | Age: 54
End: 2024-05-28

## 2024-05-28 NOTE — TELEPHONE ENCOUNTER
Yes, she can take the xanax. She will just need to let anesthesia know when she speaks with them prior to the procedure    JM Carrillo-CNP

## 2024-05-28 NOTE — TELEPHONE ENCOUNTER
Spoke with pt in Cori's absence - went over all preps and time of arrival. She understood but wanted to make sure it was ok for her to still take her Xanax the morning of the procedure.  Please review and call pt to advise.   Thank you.

## 2024-05-28 NOTE — TELEPHONE ENCOUNTER
Pt asking for call back to discuss instructions and verify appointment time. She is asking to talk to Cori. Pt number is [phone]. Please advise. Thank you.

## 2024-05-30 ENCOUNTER — ANESTHESIA (OUTPATIENT)
Age: 54
End: 2024-05-30
Payer: COMMERCIAL

## 2024-05-30 ENCOUNTER — ANESTHESIA EVENT (OUTPATIENT)
Age: 54
End: 2024-05-30
Payer: COMMERCIAL

## 2024-05-30 ENCOUNTER — HOSPITAL ENCOUNTER (OUTPATIENT)
Age: 54
Setting detail: OUTPATIENT SURGERY
Discharge: HOME | End: 2024-05-30
Attending: INTERNAL MEDICINE | Admitting: INTERNAL MEDICINE
Payer: COMMERCIAL

## 2024-05-30 VITALS
DIASTOLIC BLOOD PRESSURE: 66 MMHG | HEART RATE: 80 BPM | RESPIRATION RATE: 14 BRPM | OXYGEN SATURATION: 93 % | HEIGHT: 62 IN | SYSTOLIC BLOOD PRESSURE: 103 MMHG | BODY MASS INDEX: 20.8 KG/M2 | WEIGHT: 113 LBS | TEMPERATURE: 97.1 F

## 2024-05-30 DIAGNOSIS — I47.10 SVT (SUPRAVENTRICULAR TACHYCARDIA) (HCC): ICD-10-CM

## 2024-05-30 LAB
ANION GAP SERPL CALCULATED.3IONS-SCNC: 13 MMOL/L (ref 3–16)
BUN SERPL-MCNC: 7 MG/DL (ref 7–20)
CALCIUM SERPL-MCNC: 9.8 MG/DL (ref 8.3–10.6)
CHLORIDE SERPL-SCNC: 102 MMOL/L (ref 99–110)
CO2 SERPL-SCNC: 24 MMOL/L (ref 21–32)
CREAT SERPL-MCNC: 0.8 MG/DL (ref 0.6–1.1)
DEPRECATED RDW RBC AUTO: 13.6 % (ref 12.4–15.4)
ECHO BSA: 1.5 M2
EKG ATRIAL RATE: 82 BPM
EKG DIAGNOSIS: NORMAL
EKG P AXIS: 70 DEGREES
EKG P-R INTERVAL: 138 MS
EKG Q-T INTERVAL: 388 MS
EKG QRS DURATION: 86 MS
EKG QTC CALCULATION (BAZETT): 453 MS
EKG R AXIS: 21 DEGREES
EKG T AXIS: 40 DEGREES
EKG VENTRICULAR RATE: 82 BPM
GFR SERPLBLD CREATININE-BSD FMLA CKD-EPI: 89 ML/MIN/{1.73_M2}
GLUCOSE SERPL-MCNC: 91 MG/DL (ref 70–99)
HCT VFR BLD AUTO: 43.2 % (ref 36–48)
HGB BLD-MCNC: 14.1 G/DL (ref 12–16)
MCH RBC QN AUTO: 28.4 PG (ref 26–34)
MCHC RBC AUTO-ENTMCNC: 32.7 G/DL (ref 31–36)
MCV RBC AUTO: 86.9 FL (ref 80–100)
PLATELET # BLD AUTO: 338 K/UL (ref 135–450)
PLATELET # BLD AUTO: NORMAL K/UL (ref 135–450)
PLATELET BLD QL SMEAR: NORMAL
PMV BLD AUTO: NORMAL FL (ref 5–10.5)
POTASSIUM SERPL-SCNC: 3.7 MMOL/L (ref 3.5–5.1)
RBC # BLD AUTO: 4.97 M/UL (ref 4–5.2)
SLIDE REVIEW: NORMAL
SODIUM SERPL-SCNC: 139 MMOL/L (ref 136–145)
WBC # BLD AUTO: 7 K/UL (ref 4–11)

## 2024-05-30 PROCEDURE — 93621 COMP EP EVL L PAC&REC C SINS: CPT | Performed by: INTERNAL MEDICINE

## 2024-05-30 PROCEDURE — 80048 BASIC METABOLIC PNL TOTAL CA: CPT

## 2024-05-30 PROCEDURE — 6360000002 HC RX W HCPCS: Performed by: ANESTHESIOLOGY

## 2024-05-30 PROCEDURE — 6370000000 HC RX 637 (ALT 250 FOR IP): Performed by: INTERNAL MEDICINE

## 2024-05-30 PROCEDURE — 2500000003 HC RX 250 WO HCPCS

## 2024-05-30 PROCEDURE — 6360000002 HC RX W HCPCS: Performed by: NURSE ANESTHETIST, CERTIFIED REGISTERED

## 2024-05-30 PROCEDURE — 93620 COMP EP EVL R AT VEN PAC&REC: CPT | Performed by: INTERNAL MEDICINE

## 2024-05-30 PROCEDURE — 3700000001 HC ADD 15 MINUTES (ANESTHESIA): Performed by: INTERNAL MEDICINE

## 2024-05-30 PROCEDURE — 2500000003 HC RX 250 WO HCPCS: Performed by: NURSE ANESTHETIST, CERTIFIED REGISTERED

## 2024-05-30 PROCEDURE — 85049 AUTOMATED PLATELET COUNT: CPT

## 2024-05-30 PROCEDURE — 93005 ELECTROCARDIOGRAM TRACING: CPT | Performed by: ANESTHESIOLOGY

## 2024-05-30 PROCEDURE — 93613 INTRACARDIAC EPHYS 3D MAPG: CPT | Performed by: INTERNAL MEDICINE

## 2024-05-30 PROCEDURE — 6360000002 HC RX W HCPCS

## 2024-05-30 PROCEDURE — 7100000011 HC PHASE II RECOVERY - ADDTL 15 MIN: Performed by: INTERNAL MEDICINE

## 2024-05-30 PROCEDURE — 2500000003 HC RX 250 WO HCPCS: Performed by: INTERNAL MEDICINE

## 2024-05-30 PROCEDURE — 93623 PRGRMD STIMJ&PACG IV RX NFS: CPT

## 2024-05-30 PROCEDURE — 2709999900 HC NON-CHARGEABLE SUPPLY: Performed by: INTERNAL MEDICINE

## 2024-05-30 PROCEDURE — 93620 COMP EP EVL R AT VEN PAC&REC: CPT

## 2024-05-30 PROCEDURE — 85027 COMPLETE CBC AUTOMATED: CPT

## 2024-05-30 PROCEDURE — 7100000010 HC PHASE II RECOVERY - FIRST 15 MIN: Performed by: INTERNAL MEDICINE

## 2024-05-30 PROCEDURE — 93623 PRGRMD STIMJ&PACG IV RX NFS: CPT | Performed by: INTERNAL MEDICINE

## 2024-05-30 PROCEDURE — 6360000002 HC RX W HCPCS: Performed by: INTERNAL MEDICINE

## 2024-05-30 PROCEDURE — 7100000000 HC PACU RECOVERY - FIRST 15 MIN: Performed by: INTERNAL MEDICINE

## 2024-05-30 PROCEDURE — 2580000003 HC RX 258

## 2024-05-30 PROCEDURE — 2580000003 HC RX 258: Performed by: NURSE ANESTHETIST, CERTIFIED REGISTERED

## 2024-05-30 PROCEDURE — 36415 COLL VENOUS BLD VENIPUNCTURE: CPT

## 2024-05-30 PROCEDURE — 7100000001 HC PACU RECOVERY - ADDTL 15 MIN: Performed by: INTERNAL MEDICINE

## 2024-05-30 PROCEDURE — 3700000000 HC ANESTHESIA ATTENDED CARE: Performed by: INTERNAL MEDICINE

## 2024-05-30 PROCEDURE — 93005 ELECTROCARDIOGRAM TRACING: CPT | Performed by: INTERNAL MEDICINE

## 2024-05-30 PROCEDURE — C1732 CATH, EP, DIAG/ABL, 3D/VECT: HCPCS | Performed by: INTERNAL MEDICINE

## 2024-05-30 PROCEDURE — 93621 COMP EP EVL L PAC&REC C SINS: CPT

## 2024-05-30 PROCEDURE — C1730 CATH, EP, 19 OR FEW ELECT: HCPCS | Performed by: INTERNAL MEDICINE

## 2024-05-30 PROCEDURE — C1769 GUIDE WIRE: HCPCS | Performed by: INTERNAL MEDICINE

## 2024-05-30 PROCEDURE — 93618 INDCTJ ARRHYTHMIA ELEC PACG: CPT | Performed by: INTERNAL MEDICINE

## 2024-05-30 PROCEDURE — C1894 INTRO/SHEATH, NON-LASER: HCPCS | Performed by: INTERNAL MEDICINE

## 2024-05-30 PROCEDURE — 93613 INTRACARDIAC EPHYS 3D MAPG: CPT

## 2024-05-30 PROCEDURE — 93010 ELECTROCARDIOGRAM REPORT: CPT | Performed by: INTERNAL MEDICINE

## 2024-05-30 RX ORDER — SODIUM CHLORIDE 9 MG/ML
INJECTION, SOLUTION INTRAVENOUS PRN
Status: DISCONTINUED | OUTPATIENT
Start: 2024-05-30 | End: 2024-05-30 | Stop reason: HOSPADM

## 2024-05-30 RX ORDER — NALOXONE HYDROCHLORIDE 0.4 MG/ML
INJECTION, SOLUTION INTRAMUSCULAR; INTRAVENOUS; SUBCUTANEOUS PRN
Status: DISCONTINUED | OUTPATIENT
Start: 2024-05-30 | End: 2024-05-30 | Stop reason: HOSPADM

## 2024-05-30 RX ORDER — HYDROMORPHONE HYDROCHLORIDE 2 MG/ML
0.25 INJECTION, SOLUTION INTRAMUSCULAR; INTRAVENOUS; SUBCUTANEOUS ONCE
Status: COMPLETED | OUTPATIENT
Start: 2024-05-30 | End: 2024-05-30

## 2024-05-30 RX ORDER — SODIUM CHLORIDE 9 MG/ML
INJECTION, SOLUTION INTRAVENOUS CONTINUOUS PRN
Status: DISCONTINUED | OUTPATIENT
Start: 2024-05-30 | End: 2024-05-30 | Stop reason: SDUPTHER

## 2024-05-30 RX ORDER — FENTANYL CITRATE 50 UG/ML
INJECTION, SOLUTION INTRAMUSCULAR; INTRAVENOUS PRN
Status: DISCONTINUED | OUTPATIENT
Start: 2024-05-30 | End: 2024-05-30 | Stop reason: SDUPTHER

## 2024-05-30 RX ORDER — SODIUM CHLORIDE 0.9 % (FLUSH) 0.9 %
5-40 SYRINGE (ML) INJECTION EVERY 12 HOURS SCHEDULED
Status: CANCELLED | OUTPATIENT
Start: 2024-05-30

## 2024-05-30 RX ORDER — LABETALOL HYDROCHLORIDE 5 MG/ML
5 INJECTION, SOLUTION INTRAVENOUS
Status: DISCONTINUED | OUTPATIENT
Start: 2024-05-30 | End: 2024-05-30 | Stop reason: HOSPADM

## 2024-05-30 RX ORDER — SODIUM CHLORIDE 0.9 % (FLUSH) 0.9 %
5-40 SYRINGE (ML) INJECTION PRN
Status: DISCONTINUED | OUTPATIENT
Start: 2024-05-30 | End: 2024-05-30 | Stop reason: HOSPADM

## 2024-05-30 RX ORDER — PROPOFOL 10 MG/ML
INJECTION, EMULSION INTRAVENOUS PRN
Status: DISCONTINUED | OUTPATIENT
Start: 2024-05-30 | End: 2024-05-30 | Stop reason: SDUPTHER

## 2024-05-30 RX ORDER — ROCURONIUM BROMIDE 10 MG/ML
INJECTION, SOLUTION INTRAVENOUS PRN
Status: DISCONTINUED | OUTPATIENT
Start: 2024-05-30 | End: 2024-05-30 | Stop reason: SDUPTHER

## 2024-05-30 RX ORDER — DEXAMETHASONE SODIUM PHOSPHATE 4 MG/ML
INJECTION, SOLUTION INTRA-ARTICULAR; INTRALESIONAL; INTRAMUSCULAR; INTRAVENOUS; SOFT TISSUE PRN
Status: DISCONTINUED | OUTPATIENT
Start: 2024-05-30 | End: 2024-05-30 | Stop reason: SDUPTHER

## 2024-05-30 RX ORDER — SODIUM CHLORIDE 0.9 % (FLUSH) 0.9 %
5-40 SYRINGE (ML) INJECTION EVERY 12 HOURS SCHEDULED
Status: DISCONTINUED | OUTPATIENT
Start: 2024-05-30 | End: 2024-05-30 | Stop reason: HOSPADM

## 2024-05-30 RX ORDER — ONDANSETRON 2 MG/ML
4 INJECTION INTRAMUSCULAR; INTRAVENOUS
Status: DISCONTINUED | OUTPATIENT
Start: 2024-05-30 | End: 2024-05-30 | Stop reason: HOSPADM

## 2024-05-30 RX ORDER — ACETAMINOPHEN 325 MG/1
650 TABLET ORAL EVERY 4 HOURS PRN
Status: DISCONTINUED | OUTPATIENT
Start: 2024-05-30 | End: 2024-05-30 | Stop reason: HOSPADM

## 2024-05-30 RX ORDER — LIDOCAINE HYDROCHLORIDE 20 MG/ML
INJECTION, SOLUTION EPIDURAL; INFILTRATION; INTRACAUDAL; PERINEURAL PRN
Status: DISCONTINUED | OUTPATIENT
Start: 2024-05-30 | End: 2024-05-30 | Stop reason: SDUPTHER

## 2024-05-30 RX ORDER — PHENYLEPHRINE HCL IN 0.9% NACL 1 MG/10 ML
SYRINGE (ML) INTRAVENOUS PRN
Status: DISCONTINUED | OUTPATIENT
Start: 2024-05-30 | End: 2024-05-30 | Stop reason: SDUPTHER

## 2024-05-30 RX ORDER — HYDROMORPHONE HYDROCHLORIDE 2 MG/ML
0.5 INJECTION, SOLUTION INTRAMUSCULAR; INTRAVENOUS; SUBCUTANEOUS EVERY 5 MIN PRN
Status: DISCONTINUED | OUTPATIENT
Start: 2024-05-30 | End: 2024-05-30 | Stop reason: HOSPADM

## 2024-05-30 RX ORDER — ADENOSINE 3 MG/ML
INJECTION, SOLUTION INTRAVENOUS PRN
Status: DISCONTINUED | OUTPATIENT
Start: 2024-05-30 | End: 2024-05-30 | Stop reason: HOSPADM

## 2024-05-30 RX ORDER — DEXMEDETOMIDINE HYDROCHLORIDE 100 UG/ML
INJECTION, SOLUTION INTRAVENOUS PRN
Status: DISCONTINUED | OUTPATIENT
Start: 2024-05-30 | End: 2024-05-30 | Stop reason: SDUPTHER

## 2024-05-30 RX ORDER — SODIUM CHLORIDE 9 MG/ML
INJECTION, SOLUTION INTRAVENOUS CONTINUOUS
Status: DISCONTINUED | OUTPATIENT
Start: 2024-05-30 | End: 2024-05-30 | Stop reason: HOSPADM

## 2024-05-30 RX ORDER — LIDOCAINE HYDROCHLORIDE 10 MG/ML
1 INJECTION, SOLUTION EPIDURAL; INFILTRATION; INTRACAUDAL; PERINEURAL
Status: CANCELLED | OUTPATIENT
Start: 2024-05-30 | End: 2024-05-31

## 2024-05-30 RX ORDER — SODIUM CHLORIDE 0.9 % (FLUSH) 0.9 %
5-40 SYRINGE (ML) INJECTION PRN
Status: CANCELLED | OUTPATIENT
Start: 2024-05-30

## 2024-05-30 RX ORDER — SODIUM CHLORIDE 9 MG/ML
INJECTION, SOLUTION INTRAVENOUS PRN
Status: CANCELLED | OUTPATIENT
Start: 2024-05-30

## 2024-05-30 RX ORDER — MIDAZOLAM HYDROCHLORIDE 1 MG/ML
INJECTION INTRAMUSCULAR; INTRAVENOUS PRN
Status: DISCONTINUED | OUTPATIENT
Start: 2024-05-30 | End: 2024-05-30 | Stop reason: SDUPTHER

## 2024-05-30 RX ORDER — HYDROMORPHONE HYDROCHLORIDE 2 MG/ML
0.25 INJECTION, SOLUTION INTRAMUSCULAR; INTRAVENOUS; SUBCUTANEOUS EVERY 5 MIN PRN
Status: DISCONTINUED | OUTPATIENT
Start: 2024-05-30 | End: 2024-05-30 | Stop reason: HOSPADM

## 2024-05-30 RX ORDER — ONDANSETRON 2 MG/ML
INJECTION INTRAMUSCULAR; INTRAVENOUS PRN
Status: DISCONTINUED | OUTPATIENT
Start: 2024-05-30 | End: 2024-05-30 | Stop reason: SDUPTHER

## 2024-05-30 RX ADMIN — SODIUM CHLORIDE: 9 INJECTION, SOLUTION INTRAVENOUS at 12:05

## 2024-05-30 RX ADMIN — LIDOCAINE HYDROCHLORIDE 100 MG: 20 INJECTION, SOLUTION EPIDURAL; INFILTRATION; INTRACAUDAL; PERINEURAL at 12:13

## 2024-05-30 RX ADMIN — FENTANYL CITRATE 25 MCG: 50 INJECTION, SOLUTION INTRAMUSCULAR; INTRAVENOUS at 14:00

## 2024-05-30 RX ADMIN — ONDANSETRON 4 MG: 2 INJECTION INTRAMUSCULAR; INTRAVENOUS at 14:01

## 2024-05-30 RX ADMIN — ACETAMINOPHEN 650 MG: 325 TABLET ORAL at 19:24

## 2024-05-30 RX ADMIN — Medication 100 MCG: at 13:30

## 2024-05-30 RX ADMIN — Medication 100 MCG: at 13:03

## 2024-05-30 RX ADMIN — Medication 100 MCG: at 12:29

## 2024-05-30 RX ADMIN — ISOPROTERENOL HYDROCHLORIDE 2 MCG/MIN: 0.2 INJECTION, SOLUTION INTRAMUSCULAR; INTRAVENOUS at 12:53

## 2024-05-30 RX ADMIN — ROCURONIUM BROMIDE 50 MG: 10 INJECTION, SOLUTION INTRAVENOUS at 12:13

## 2024-05-30 RX ADMIN — MIDAZOLAM 1 MG: 1 INJECTION INTRAMUSCULAR; INTRAVENOUS at 14:02

## 2024-05-30 RX ADMIN — ONDANSETRON 4 MG: 2 INJECTION INTRAMUSCULAR; INTRAVENOUS at 12:20

## 2024-05-30 RX ADMIN — PHENYLEPHRINE HYDROCHLORIDE 25 MCG/MIN: 10 INJECTION INTRAVENOUS at 12:36

## 2024-05-30 RX ADMIN — DEXMEDETOMIDINE HYDROCHLORIDE 20 MCG: 100 INJECTION, SOLUTION INTRAVENOUS at 14:12

## 2024-05-30 RX ADMIN — FENTANYL CITRATE 25 MCG: 50 INJECTION, SOLUTION INTRAMUSCULAR; INTRAVENOUS at 13:56

## 2024-05-30 RX ADMIN — PROPOFOL 120 MG: 10 INJECTION, EMULSION INTRAVENOUS at 12:13

## 2024-05-30 RX ADMIN — Medication 100 MCG: at 13:21

## 2024-05-30 RX ADMIN — MIDAZOLAM 1 MG: 1 INJECTION INTRAMUSCULAR; INTRAVENOUS at 14:00

## 2024-05-30 RX ADMIN — DEXAMETHASONE SODIUM PHOSPHATE 8 MG: 4 INJECTION, SOLUTION INTRAMUSCULAR; INTRAVENOUS at 12:20

## 2024-05-30 RX ADMIN — FENTANYL CITRATE 50 MCG: 50 INJECTION, SOLUTION INTRAMUSCULAR; INTRAVENOUS at 12:13

## 2024-05-30 RX ADMIN — SUGAMMADEX 200 MG: 100 INJECTION, SOLUTION INTRAVENOUS at 13:50

## 2024-05-30 RX ADMIN — HYDROMORPHONE HYDROCHLORIDE 0.25 MG: 2 INJECTION, SOLUTION INTRAMUSCULAR; INTRAVENOUS; SUBCUTANEOUS at 17:33

## 2024-05-30 RX ADMIN — ROCURONIUM BROMIDE 20 MG: 10 INJECTION, SOLUTION INTRAVENOUS at 13:09

## 2024-05-30 ASSESSMENT — PAIN DESCRIPTION - DESCRIPTORS
DESCRIPTORS: ACHING;THROBBING
DESCRIPTORS: THROBBING;ACHING

## 2024-05-30 ASSESSMENT — PAIN DESCRIPTION - ORIENTATION: ORIENTATION: RIGHT;LEFT

## 2024-05-30 ASSESSMENT — PAIN SCALES - GENERAL: PAINLEVEL_OUTOF10: 8

## 2024-05-30 ASSESSMENT — PAIN DESCRIPTION - LOCATION: LOCATION: ARM;LEG

## 2024-05-30 NOTE — PROGRESS NOTES
Patient complaining of 8/10 throbbing pain in R groin that goes down to R foot and then radiates up to L shoulder and down L arm. Verbal orders per Dr. Perez to get stat EKG. EKG shows NSR. BP 80s/60s when patient was asleep prior to complaining of pain but went up after stimulation and being alert. All other vital signs remain stable.    Dr. Mariee called to make aware of the situation. Patient requesting to go home on pain medication to help manage pain at home. Dr. Mariee not agreeable and states patient can have a dose of dilaudid here but to take tylenol and motrin around the clock for the rest of the night. Per Dr. Mariee patient can call office in the morning if pain does not get better or worse. Patient and  updated on POC and agreeable. Will give dose of dilaudid and 650 mg of tylenol and continue with discharge process

## 2024-05-30 NOTE — DISCHARGE INSTRUCTIONS
Ablation discharge Instructions    Care of your puncture site:  Remove bandage 24 hours after the procedure.  May shower in 24 hours but do not sit in a bathtub/pool of water for 5 days or until the wound is healed.  Inspect the site daily and gently clean using soap and water while standing in the shower.  Dry thoroughly and apply a Band-Aid that covers the entire site. Do not apply powder or lotion.    Normal Observations:  Soreness or tenderness which may last one week.  Mild oozing from the incision site.  Possible bruising that could last 2 weeks.    Activity:  You may resume driving 24 hours following the procedure.  You may resume normal activity in 5 days or after the wound heals.  Avoid lifting more than 10 pounds for 5 days or until the wound heals.  Avoid strenuous exercise or activity for 1 week..    Nutrition:  Regular diet   Drink at least 8 to 10 glasses of decaffeinated, non-alcoholic fluid for the next 24 hours to flush the x-ray dye used for your angiogram out of your body.    Call your doctor immediately if your condition worsens, for any other concerns, for a follow-up appointment or if you experience any of the following:  Significant bleeding that does not stop after 10 minutes of applying firm pressure on the puncture site.  Increased swelling on the groin or leg.  Unusual pain, numbness, or tingling of the groin or down the leg.  Any signs of infection such as: redness, yellow drainage at the site, swelling or pain.    [facility] DISCHARGE INSTRUCTIONS    5/30/2024     Wear your seatbelt home.  You are under the influence of drugs. Do not drink alcohol, drive, operate machinery, or make any important decisions or sign any legal documents for 24 hours  A responsible adult needs to be with you for 24 hours.  You may experience lightheadedness, dizziness, nausea, heightened emotions and/or sleepiness following surgery.  Rest at home today- increase activity as tolerated.  Progress slowly to a

## 2024-05-30 NOTE — PROCEDURES
[facility]     Electrophysiology Procedure Note       Date of Procedure: 5/30/2024  Patient's Name: Elvia Shabazz  YOB: 1970   Medical Record Number: [id number]  Procedure Performed by: Kodi Cuenca MD    Procedure performed:    Comprehensive electrophysiological study with attempted induction of arrhythmia at baseline.  Attempted induction of arrhythmia after IV drug infusion.    Indications for procedure: Arrhythmia SVT   Elvia Shabazz 53 y.o. female with PMH of palpitation and syncope.     Details of procedure:    The risks, benefits, alternatives of procedure were explained to the patient. All questions were answered and patient understood. Written informed consent was signed and placed in the chart.  The patient was brought to the electrophysiology lab in a fasting nonsedated state. The patient was prepped and draped in a sterile fashion. A timeout protocol was completed to identify the patient and the procedure being performed.     Anesthesia team provided sedation.     Ultrasound was used for femoral venous access. After injection of 2% lidocaine, femoral vein access was obtained using modified seldinger technique and US guidance.   We gained access to right femoral vein.   Two 8F, one 6F sheath used and were placed in the right femoral vein using modified Seldinger technique and ultrasound guidance.     A deca polar catheter was advanced into the coronary sinus so the distal poles were in the coronary sinus for left atrial recording and mapping. Two quadripolar catheters were advanced sequentially to His bundle position and right ventricular apex. Comprehensive EP study and programmed stimulation was performed.     . TN interval: 133 ms  . QRS duration: 90 ms  . QT interval: 380 ms   . A-H interval of 65 ms and H-V interval of 41  ms.  . 1:1 antegrade conduction over AV block (AV fermín wenckebach cycle length) was 320 ms at baseline and 240 ms on isuprel   . There was evidence

## 2024-05-30 NOTE — ANESTHESIA PRE PROCEDURE
Dispense Refill   • aspirin 81 MG EC tablet Take 1 tablet by mouth daily 90 tablet 3   • metoprolol tartrate (LOPRESSOR) 50 MG tablet Take 1 tablet by mouth 2 times daily Can take additional 25 mg (1/2 tablet) every 12 hours as needed for heart racing 200 tablet 3   • atorvastatin (LIPITOR) 20 MG tablet Take 1 tablet by mouth daily     • ergocalciferol (ERGOCALCIFEROL) 1.25 MG (65041 UT) capsule Take 1 capsule by mouth once a week     • folic acid 5 MG/ML injection Inject 1 mL into the muscle daily     • pyridoxine (B-6) 100 MG tablet Take 1 tablet by mouth daily     • cyanocobalamin 1000 MCG/ML injection Inject 1 mL into the muscle every 14 days     • citalopram (CELEXA) 20 MG tablet Take 1 tablet by mouth as needed     • polyethylene glycol (GLYCOLAX) 17 GM/SCOOP powder Take 17 g by mouth daily     • montelukast (SINGULAIR) 10 MG tablet Take 1 tablet by mouth nightly     • omeprazole-sodium bicarbonate (ZEGERID)  MG PACK 1 packet every morning (before breakfast)     • alprazolam (XANAX) 0.5 MG tablet Take 1 tablet by mouth nightly as needed.         Allergies:    Allergies   Allergen Reactions   • Penicillins Anaphylaxis   • Biaxin [Clarithromycin]    • Imitrex [Sumatriptan]    • Topamax    • Aspirin Nausea And Vomiting       Problem List:    Patient Active Problem List   Diagnosis Code   • Chest pain R07.9   • Palpitations R00.2   • Hyperlipidemia E78.5   • Essential hypertension I10   • Tobacco abuse Z72.0   • Carotid stenosis I65.29   • Mitral regurgitation I34.0   • Syncope and collapse R55   • SVT (supraventricular tachycardia) ([facility]) I47.10   • Hypotension I95.9       Past Medical History:        Diagnosis Date   • Asthma    • COPD (chronic obstructive pulmonary disease) ([facility])    • Essential hypertension 11/16/2022   • Hypercholesteremia    • Migraine    • Scoliosis    • Seasonal allergies        Past Surgical History:        Procedure Laterality Date   • CHOLECYSTECTOMY     • HYSTERECTOMY (CERVIX

## 2024-05-30 NOTE — ANESTHESIA POSTPROCEDURE EVALUATION
Department of Anesthesiology  Postprocedure Note    Patient: Elvia Shabazz  MRN: [id number]  YOB: 1970  Date of evaluation: 5/30/2024    Procedure Summary       Date: 05/30/24 Room / Location: [facility] EP LAB 5 / [facility] CARDIAC CATH LAB    Anesthesia Start: 1206 Anesthesia Stop: 1415    Procedure: ABLATION SVT Diagnosis:       SVT (supraventricular tachycardia) (HCC)      (SVT (supraventricular tachycardia) (HCC) [I47.10])    Providers: Kodi Cuenca MD Responsible Provider: Karen Perez MD    Anesthesia Type: general ASA Status: 3            Anesthesia Type: No value filed.    Amy Phase I: Amy Score: 8    Amy Phase II:      Anesthesia Post Evaluation    Patient location during evaluation: PACU  Patient participation: complete - patient participated  Level of consciousness: awake  Airway patency: patent  Nausea & Vomiting: no vomiting  Cardiovascular status: hemodynamically stable  Respiratory status: acceptable  Hydration status: euvolemic  Comments: Patient states she is having chest pain that is under her left arm.  EKG is normal.  She says it is worse when she pushes on the area.  She states she has neuropathy all over her body.  VSS.   Multimodal analgesia pain management approach    There were no known notable events for this encounter.

## 2024-05-30 NOTE — PROGRESS NOTES
Patient to PACU from cath lab.Patient arousable to voice. VSS. R groin site soft without hematoma or bleeding. R pedal pulse palpable.

## 2024-05-30 NOTE — PROGRESS NOTES
Patient asleep with RR >10. VSS. Dressing to R groin CDI, groin soft. R pedal pulse 2+. Patient denies nausea. Family updated. Phase one criteria met, will transition to phase two for discharge. Bed rest up at 1710, will continue to monitor

## 2024-05-31 LAB
EKG ATRIAL RATE: 82 BPM
EKG DIAGNOSIS: NORMAL
EKG P AXIS: 67 DEGREES
EKG P-R INTERVAL: 148 MS
EKG Q-T INTERVAL: 402 MS
EKG QRS DURATION: 74 MS
EKG QTC CALCULATION (BAZETT): 469 MS
EKG R AXIS: 16 DEGREES
EKG T AXIS: 30 DEGREES
EKG VENTRICULAR RATE: 82 BPM

## 2024-05-31 PROCEDURE — 93010 ELECTROCARDIOGRAM REPORT: CPT | Performed by: INTERNAL MEDICINE

## 2024-05-31 NOTE — PROGRESS NOTES
Patient verbalized readiness to get up. Patient walked to bathroom to void and back to stretcher in bay- tolerated well. R groin remains soft, clean, dry and intact. Patient hooked back up to monitor with VSS. Will continue with discharge

## 2024-05-31 NOTE — PROGRESS NOTES
Discharge instructions with groin care reviewed with patient and pts - both verbalized understanding. Pts  assisted pt in safely getting dressed and IV was removed with no complications. Pt was wheeled to front lobby to be taken home by family. All questions answered at this time. 650 mg of tylenol given to patient- patient aware when next dose can be taken. Dressing to R groin remains CDI with groin soft. Discharge complete

## 2024-06-01 ENCOUNTER — TELEPHONE (OUTPATIENT)
Dept: CARDIOLOGY | Age: 54
End: 2024-06-01

## 2024-07-05 NOTE — PROGRESS NOTES
[facility]   Cardiac f/up    Referring Provider:  Win Rubin MD     Chief Complaint   Patient presents with    Hyperlipidemia       History of Present Illness:  Elvia Shabazz is a 53 y.o.  female being seen today in hospital follow up. She has a history of MR, Hld, COPD with ongoing tobacco abuse, carotid disease, and anorexia.    11/10/2022 holter placed for complaints of heart racing showed one run of SVT.    Last OV on 12/22/23 - she reports being at [employer] and feeling fine one minute and then suddenly felt weak, lightheaded, and jittery. She had a syncopal episode and awoke in the life squad. When she came to she felt fine. Echo was done and showed normal LV function with some regional wall motion abnormality. She had an abnormal stress test and ultimately had cardiac cath which showed small arteries but no significant CAD. She was sent home on ASA and she thinks it is making her nauseated. She was also started on Lisinopril, Lopressor, and Lipitor. She has sinus pressure behind her ears and she was sent home with a script for Azithromycin but they told her to get permission from me to take it.    In the interval, Elvia had a cardiac MRI done which ruled out inflammation in heart muscle.  She established with EP, Dr. Cuenca.  Reported improvement in feeling her heart racing since starting the Lopressor and decided to proceed with EPS/ablation     Today, Elvia is here for 4 mos follow up.  Since ablation (5/30/24), she feels \"about the same,\" still feels heart race every day. She explains that sometimes she will break out in a sweat and feel her heart race. No c/o cp, sob. She is with her .  She takes Lopressor 50 mg bid and sometimes 25 mg extra.    Past Medical History:   has a past medical history of Asthma, COPD (chronic obstructive pulmonary disease) (HCC), Essential hypertension, Hypercholesteremia, Migraine, Scoliosis, and Seasonal allergies.    Surgical History:   has a

## 2024-07-29 ENCOUNTER — OFFICE VISIT (OUTPATIENT)
Dept: CARDIOLOGY CLINIC | Age: 54
End: 2024-07-29
Payer: COMMERCIAL

## 2024-07-29 ENCOUNTER — ANCILLARY PROCEDURE (OUTPATIENT)
Dept: CARDIOLOGY CLINIC | Age: 54
End: 2024-07-29

## 2024-07-29 VITALS
DIASTOLIC BLOOD PRESSURE: 60 MMHG | HEIGHT: 62 IN | HEART RATE: 77 BPM | BODY MASS INDEX: 21.71 KG/M2 | OXYGEN SATURATION: 98 % | WEIGHT: 118 LBS | SYSTOLIC BLOOD PRESSURE: 92 MMHG

## 2024-07-29 DIAGNOSIS — R55 SYNCOPE AND COLLAPSE: ICD-10-CM

## 2024-07-29 DIAGNOSIS — I47.10 SVT (SUPRAVENTRICULAR TACHYCARDIA) (HCC): Primary | ICD-10-CM

## 2024-07-29 DIAGNOSIS — I95.9 HYPOTENSION, UNSPECIFIED HYPOTENSION TYPE: ICD-10-CM

## 2024-07-29 DIAGNOSIS — I48.0 PAROXYSMAL ATRIAL FIBRILLATION (HCC): ICD-10-CM

## 2024-07-29 DIAGNOSIS — E78.5 HYPERLIPIDEMIA, UNSPECIFIED HYPERLIPIDEMIA TYPE: ICD-10-CM

## 2024-07-29 DIAGNOSIS — Z72.0 TOBACCO ABUSE: ICD-10-CM

## 2024-07-29 DIAGNOSIS — I65.23 BILATERAL CAROTID ARTERY STENOSIS: ICD-10-CM

## 2024-07-29 DIAGNOSIS — I34.0 MITRAL VALVE INSUFFICIENCY, UNSPECIFIED ETIOLOGY: ICD-10-CM

## 2024-07-29 LAB — ECHO BSA: 1.53 M2

## 2024-07-29 PROCEDURE — 1036F TOBACCO NON-USER: CPT | Performed by: INTERNAL MEDICINE

## 2024-07-29 PROCEDURE — G8420 CALC BMI NORM PARAMETERS: HCPCS | Performed by: INTERNAL MEDICINE

## 2024-07-29 PROCEDURE — 3017F COLORECTAL CA SCREEN DOC REV: CPT | Performed by: INTERNAL MEDICINE

## 2024-07-29 PROCEDURE — 99214 OFFICE O/P EST MOD 30 MIN: CPT | Performed by: INTERNAL MEDICINE

## 2024-07-29 PROCEDURE — 3074F SYST BP LT 130 MM HG: CPT | Performed by: INTERNAL MEDICINE

## 2024-07-29 PROCEDURE — 3078F DIAST BP <80 MM HG: CPT | Performed by: INTERNAL MEDICINE

## 2024-07-29 PROCEDURE — G8427 DOCREV CUR MEDS BY ELIG CLIN: HCPCS | Performed by: INTERNAL MEDICINE

## 2024-07-29 NOTE — PATIENT INSTRUCTIONS
30 day cardiac monitor   Continue risk factor modifications.   Call for any change in symptoms, call to report any changes in shortness of breath or development of chest pain with activity.    Follow up in 6 mos

## 2024-09-18 ENCOUNTER — TELEPHONE (OUTPATIENT)
Dept: CARDIOLOGY CLINIC | Age: 54
End: 2024-09-18

## 2024-09-26 LAB — ECHO BSA: 1.53 M2

## 2024-10-03 DIAGNOSIS — I47.10 SVT (SUPRAVENTRICULAR TACHYCARDIA) (HCC): ICD-10-CM

## 2024-10-03 RX ORDER — METOPROLOL TARTRATE 50 MG
50 TABLET ORAL 2 TIMES DAILY
Qty: 180 TABLET | Refills: 3 | Status: SHIPPED | OUTPATIENT
Start: 2024-10-03

## 2024-10-03 NOTE — TELEPHONE ENCOUNTER
Received refill request for metoprolol from [facility] pharmacy.    Last ov: 07/29/2024 LES    Last Refill: 03/27/2024 #200 w/ 3    Next appointment: 02/05/2025 LES

## 2025-01-14 NOTE — PROGRESS NOTES
[facility]   Cardiac f/up    Referring Provider:  Win Rubin MD     Chief Complaint   Patient presents with    6 Month Follow-Up    SVT    Hypertension    Hyperlipidemia       History of Present Illness:  Elvia Shabazz is a 54 y.o.  female being seen today in hospital follow up. She has a history of MR, Hld, COPD with ongoing tobacco abuse, carotid disease, and anorexia.    11/10/2022 holter placed for complaints of heart racing showed one run of SVT.    Last OV on 12/22/23 - she reports being at [employer] and feeling fine one minute and then suddenly felt weak, lightheaded, and jittery. She had a syncopal episode and awoke in the life squad. When she came to she felt fine. Echo was done and showed normal LV function with some regional wall motion abnormality. She had an abnormal stress test and ultimately had cardiac cath which showed small arteries but no significant CAD. She was sent home on ASA and she thinks it is making her nauseated. She was also started on Lisinopril, Lopressor, and Lipitor. She has sinus pressure behind her ears and she was sent home with a script for Azithromycin but they told her to get permission from me to take it.    In the interval, Elvia had a cardiac MRI done which ruled out inflammation in heart muscle.  She established with EP, Dr. Cuenca.  Reported improvement in feeling her heart racing since starting the Lopressor and decided to proceed with EPS/ablation     She is s/p ablation 5/30/24.    Today, Elvia is here for 6 mos follow up. She is with her . Her symptoms happen \"fairly often.\"  She will start to feel \"hot\" and feel heart race . She had an episode last week and typically happens about twice per week.       Past Medical History:   has a past medical history of Asthma, COPD (chronic obstructive pulmonary disease) (HCC), Essential hypertension, Hypercholesteremia, Migraine, Scoliosis, and Seasonal allergies.    Surgical History:   has a past

## 2025-02-05 ENCOUNTER — OFFICE VISIT (OUTPATIENT)
Dept: CARDIOLOGY CLINIC | Age: 55
End: 2025-02-05

## 2025-02-05 ENCOUNTER — TELEPHONE (OUTPATIENT)
Dept: CARDIOLOGY CLINIC | Age: 55
End: 2025-02-05

## 2025-02-05 VITALS
OXYGEN SATURATION: 97 % | HEIGHT: 62 IN | DIASTOLIC BLOOD PRESSURE: 62 MMHG | HEART RATE: 66 BPM | SYSTOLIC BLOOD PRESSURE: 108 MMHG | WEIGHT: 120.8 LBS | BODY MASS INDEX: 22.23 KG/M2

## 2025-02-05 DIAGNOSIS — R55 SYNCOPE AND COLLAPSE: ICD-10-CM

## 2025-02-05 DIAGNOSIS — E78.5 HYPERLIPIDEMIA, UNSPECIFIED HYPERLIPIDEMIA TYPE: ICD-10-CM

## 2025-02-05 DIAGNOSIS — Z72.0 TOBACCO ABUSE: ICD-10-CM

## 2025-02-05 DIAGNOSIS — I47.10 SVT (SUPRAVENTRICULAR TACHYCARDIA) (HCC): Primary | ICD-10-CM

## 2025-02-05 DIAGNOSIS — I65.23 BILATERAL CAROTID ARTERY STENOSIS: ICD-10-CM

## 2025-02-05 DIAGNOSIS — I34.0 MITRAL VALVE INSUFFICIENCY, UNSPECIFIED ETIOLOGY: ICD-10-CM

## 2025-02-05 DIAGNOSIS — I95.9 HYPOTENSION, UNSPECIFIED HYPOTENSION TYPE: ICD-10-CM

## 2025-02-05 RX ORDER — METOPROLOL TARTRATE 75 MG/1
TABLET ORAL
Qty: 135 TABLET | Refills: 3 | Status: SHIPPED | OUTPATIENT
Start: 2025-02-05

## 2025-02-05 NOTE — TELEPHONE ENCOUNTER
Placed case request. LVM for patient to call back to go over instructions and answer any questions.    Instructions for your Loop Recorder Implant and/or Removal     Our  will call you to discuss a date for you procedure within 5-10 business days     Bring a list of your medications.  Do not eat or drink anything for 4 hours prior to the procedure.  Take all morning medications the day of the procedure with a small amount of water.  Discharge instructions will be given to you at the time of your procedure.

## 2025-02-05 NOTE — PATIENT INSTRUCTIONS
Recommend ILR > implantable loop recorder  Increase Lopressor to 75 mg twice per day  Continue risk factor modifications.   Call for any change in symptoms, call to report any changes in shortness of breath or development of chest pain with activity.    Follow up in 6 mos

## 2025-04-08 RX ORDER — ASPIRIN 81 MG/1
81 TABLET, COATED ORAL DAILY
Qty: 90 TABLET | Refills: 3 | Status: SHIPPED | OUTPATIENT
Start: 2025-04-08

## 2025-04-08 NOTE — TELEPHONE ENCOUNTER
Received refill request for  aspirin from [facility] pharmacy.    Last ov: 2/05/2025 LES    Last Refill: 03/27/2024 #90 w/ 3    Next appointment: 08/01/2025 LES

## (undated) DEVICE — CATHETER EP 6FR L120CM 5MM SPC 1MM BND QPLR TORQ CTRL

## (undated) DEVICE — INTRODUCER SHTH DIA8FR CANN L23CM BLU VASC CATH W/O MINI

## (undated) DEVICE — PERCUTANEOUS ENTRY THINWALL NEEDLE  ONE-PART: Brand: COOK

## (undated) DEVICE — WIRE GUID DIAG PTFE COAT FIX COR 3MM J TIP .035INX80CM

## (undated) DEVICE — Device

## (undated) DEVICE — CATHETER EP 7FR L115CM 2-8-2MM SPC TIP 2MM 10 ELECTRD F L

## (undated) DEVICE — CATH LAB PACK: Brand: MEDLINE INDUSTRIES, INC.

## (undated) DEVICE — ELECTRODE PT RET AD L9FT HI MOIST COND ADH HYDRGEL CORDED

## (undated) DEVICE — INTRODUCER SHTH DIA6FR CANN L23CM GRN VASC CATH W/O MINI

## (undated) DEVICE — PAD, DEFIB, ADULT, RADIOTRANS, PHYSIO: Brand: MEDLINE

## (undated) DEVICE — PROBE COVER KIT: Brand: MEDLINE INDUSTRIES, INC.

## (undated) DEVICE — 3M™ RED DOT™ REPOSITIONABLE MONITORING ELECTRODE 2670-5, 5/BAG, 200/CASE, 54/PLT: Brand: RED DOT™